# Patient Record
Sex: FEMALE | Race: WHITE | NOT HISPANIC OR LATINO | Employment: OTHER | URBAN - METROPOLITAN AREA
[De-identification: names, ages, dates, MRNs, and addresses within clinical notes are randomized per-mention and may not be internally consistent; named-entity substitution may affect disease eponyms.]

---

## 2019-01-24 DIAGNOSIS — M25.539 PAIN IN WRIST, UNSPECIFIED LATERALITY: Primary | ICD-10-CM

## 2019-01-30 ENCOUNTER — OFFICE VISIT (OUTPATIENT)
Dept: ORTHOPEDICS | Facility: CLINIC | Age: 64
End: 2019-01-30
Payer: MEDICARE

## 2019-01-30 ENCOUNTER — HOSPITAL ENCOUNTER (OUTPATIENT)
Dept: RADIOLOGY | Facility: HOSPITAL | Age: 64
Discharge: HOME | End: 2019-01-30
Attending: ORTHOPAEDIC SURGERY
Payer: MEDICARE

## 2019-01-30 VITALS
HEIGHT: 66 IN | HEART RATE: 80 BPM | DIASTOLIC BLOOD PRESSURE: 66 MMHG | SYSTOLIC BLOOD PRESSURE: 149 MMHG | BODY MASS INDEX: 39.86 KG/M2 | WEIGHT: 248 LBS

## 2019-01-30 DIAGNOSIS — M25.539 PAIN IN WRIST, UNSPECIFIED LATERALITY: ICD-10-CM

## 2019-01-30 DIAGNOSIS — M65.4 DE QUERVAIN'S DISEASE (TENOSYNOVITIS): Primary | ICD-10-CM

## 2019-01-30 PROCEDURE — 99204 PR OFFICE/OUTPT VISIT, NEW, LEVL IV, 45-59 MIN: ICD-10-PCS | Mod: S$PBB,,, | Performed by: ORTHOPAEDIC SURGERY

## 2019-01-30 PROCEDURE — 73110 X-RAY EXAM OF WRIST: CPT | Mod: 50,TC

## 2019-01-30 PROCEDURE — 73110 X-RAY EXAM OF WRIST: CPT | Mod: 26,50,, | Performed by: RADIOLOGY

## 2019-01-30 PROCEDURE — 99999 PR PBB SHADOW E&M-EST. PATIENT-LVL III: ICD-10-PCS | Mod: PBBFAC,,, | Performed by: ORTHOPAEDIC SURGERY

## 2019-01-30 PROCEDURE — 99999 PR PBB SHADOW E&M-EST. PATIENT-LVL III: CPT | Mod: PBBFAC,,, | Performed by: ORTHOPAEDIC SURGERY

## 2019-01-30 PROCEDURE — 99204 OFFICE O/P NEW MOD 45 MIN: CPT | Mod: S$PBB,,, | Performed by: ORTHOPAEDIC SURGERY

## 2019-01-30 PROCEDURE — 99213 OFFICE O/P EST LOW 20 MIN: CPT | Mod: PBBFAC,25,PN | Performed by: ORTHOPAEDIC SURGERY

## 2019-01-30 PROCEDURE — 73110 XR WRIST COMPLETE 3 VIEWS BILATERAL: ICD-10-PCS | Mod: 26,50,, | Performed by: RADIOLOGY

## 2019-01-30 RX ORDER — DULOXETIN HYDROCHLORIDE 30 MG/1
30 CAPSULE, DELAYED RELEASE ORAL NIGHTLY
COMMUNITY
Start: 2019-01-24

## 2019-01-30 RX ORDER — CELECOXIB 200 MG/1
200 CAPSULE ORAL DAILY
COMMUNITY
Start: 2019-01-24 | End: 2020-01-24

## 2019-01-30 NOTE — PROGRESS NOTES
Subjective:     Patient ID: Sandra Angel is a 63 y.o. female.    Chief Complaint: Pain of the Right Wrist and Pain of the Left Wrist    Patient is here for bilateral wrist pain. States both of them bother her the same. Reports they have been bothering her for 9 months. Reports no SHAKEEL. Reports no previous injury. Reports bilateral cortisone injections in October. States she wore thumb spica splints.      Wrist Pain    The pain is present in the right wrist and left wrist. This is a new problem. The current episode started more than 1 month ago. The problem occurs constantly. The problem has been gradually improving. The quality of the pain is described as sharp, burning and aching. The pain is at a severity of 4/10. Pertinent negatives include no fever or numbness. The symptoms are aggravated by lifting (gripping, writing). She has tried brace/corset, NSAIDs, injection treatment, cold, heat, OTC ointments and OTC pain meds for the symptoms. The treatment provided moderate relief. Physical therapy was not tried.      Past Medical History:   Diagnosis Date    Clotting disorder     Hypertension     Protein S deficiency      Past Surgical History:   Procedure Laterality Date    PULMONARY EMBOLISM SURGERY       History reviewed. No pertinent family history.  Social History     Socioeconomic History    Marital status:      Spouse name: Not on file    Number of children: Not on file    Years of education: Not on file    Highest education level: Not on file   Social Needs    Financial resource strain: Not on file    Food insecurity - worry: Not on file    Food insecurity - inability: Not on file    Transportation needs - medical: Not on file    Transportation needs - non-medical: Not on file   Occupational History    Not on file   Tobacco Use    Smoking status: Never Smoker    Smokeless tobacco: Never Used   Substance and Sexual Activity    Alcohol use: No    Drug use: No    Sexual activity: Not on  file   Other Topics Concern    Not on file   Social History Narrative    Not on file        Medication List           Accurate as of 1/30/19  6:58 PM. If you have any questions, ask your nurse or doctor.               CONTINUE taking these medications    ATIVAN 2 MG Tab  Generic drug:  LORazepam     celecoxib 200 MG capsule  Commonly known as:  CeleBREX     DULoxetine 30 MG capsule  Commonly known as:  CYMBALTA     esomeprazole 40 MG capsule  Commonly known as:  NEXIUM     fluticasone 50 mcg/actuation nasal spray  Commonly known as:  FLONASE  2 sprays by Each Nare route once daily.     warfarin 5 MG tablet  Commonly known as:  COUMADIN          Review of patient's allergies indicates:   Allergen Reactions    Sulfa (sulfonamide antibiotics) Hives and Rash     Review of Systems   Constitution: Negative for fever.   HENT: Negative for hearing loss.    Eyes: Negative for blurred vision.   Cardiovascular: Negative for chest pain and leg swelling.   Respiratory: Negative for shortness of breath.    Endocrine: Negative for polyuria.   Hematologic/Lymphatic: Negative for bleeding problem.   Skin: Negative for rash.   Musculoskeletal: Positive for back pain and joint pain. Negative for joint swelling, muscle cramps and muscle weakness.   Gastrointestinal: Negative for melena.   Genitourinary: Negative for hematuria.   Neurological: Negative for loss of balance, numbness and paresthesias.   Psychiatric/Behavioral: Negative for altered mental status.       Objective:   Body mass index is 40.03 kg/m².  Vitals:    01/30/19 1555   BP: (!) 149/66   Pulse: 80       General: Sandra is well-developed, well-nourished, appears stated age, in no acute distress, alert and oriented.       General    Vitals reviewed.  Constitutional: She is oriented to person, place, and time. She appears well-developed and well-nourished. No distress.   HENT:   Right Ear: External ear normal.   Left Ear: External ear normal.   Nose: Nose normal.   Eyes:  Pupils are equal, round, and reactive to light. Right eye exhibits no discharge. Left eye exhibits no discharge.   Neck: Normal range of motion.   Pulmonary/Chest: Effort normal. No respiratory distress.   Abdominal: Soft.   Neurological: She is alert and oriented to person, place, and time. No cranial nerve deficit. She exhibits normal muscle tone. Coordination normal.   Psychiatric: She has a normal mood and affect. Her behavior is normal. Judgment and thought content normal.             Right Hand/Wrist Exam     Inspection   Scars: Wrist - absent   Effusion: Wrist - absent   Bruising: Wrist - absent   Deformity: Wrist - deformity     Tenderness   The patient is tender to palpation of the radial area.    Range of Motion     Wrist   Extension:  50 normal   Flexion:  70 normal   Abduction: 20 normal  Adduction: 35 normal    Tests   Phalens Sign: negative  Tinel's sign (median nerve): negative  Finkelstein's test: positive  Carpal Tunnel Compression Test: negative  Cubital Tunnel Compression Test: negative      Other     Neuorologic Exam    Median Distribution: normal  Ulnar Distribution: normal  Radial Distribution: normal      Left Hand/Wrist Exam     Inspection   Scars: Wrist - absent   Effusion: Wrist - absent   Bruising: Wrist - absent   Deformity: Wrist - absent     Tenderness   The patient is tender to palpation of the radial area.     Range of Motion     Wrist   Extension:  50 normal   Flexion:  70 normal   Abduction: 20 normal  Adduction: 35 normal    Tests   Phalens Sign: negative  Tinel's sign (median nerve): negative  Finkelstein's test: positive  Carpal Tunnel Compression Test: negative  Cubital Tunnel Compression Test: negative      Other     Sensory Exam  Median Distribution: normal  Ulnar Distribution: normal  Radial Distribution: normal      Right Elbow Exam     Inspection   Scars: absent  Effusion: absent  Bruising: absent  Deformity: absent  Atrophy: absent    Other   Sensation: normal      Left  Elbow Exam     Inspection   Scars: absent  Effusion: absent  Bruising: absent  Deformity: absent  Atrophy: absent    Other   Sensation: normal        Muscle Strength   Right Upper Extremity   Wrist extension: 5/5/5   Wrist flexion: 5/5/5   : 5/5/5   Pinch Mechanism: 5/5  Intrinsics: 5/5  EPL (Extensor Pollicis Longus): 5/5  Left Upper Extremity  Wrist extension: 5/5/5   Wrist flexion: 5/5/5   :  5/5/5   Pinch Mechanism: 5/5  Intrinsics: 5/5  EPL (Extensor Pollicis Longus): 5/5    Vascular Exam     Right Pulses      Radial:                    2+      Left Pulses      Radial:                    2+      Capillary Refill  Right Hand: normal capillary refill  Left Hand: normal capillary refill    Edema  Right Forearm: absent  Left Forearm: absent      Imaging reviewed and interpreted today   Alberto Winston DO 1/30/2019       Narrative     EXAMINATION:  XR WRIST COMPLETE 3 VIEWS BILATERAL    CLINICAL HISTORY:  Pain in unspecified wrist    TECHNIQUE:  PA, lateral, and oblique views of both wrists were performed.    COMPARISON:  None    FINDINGS:  No acute fracture or dislocation.  The alignment is within normal limits.  Minimal degenerative changes associated with either wrist.      Impression       As above      Electronically signed by: Alberto Winston DO  Date: 01/30/2019  Time: 15:14       Assessment:     Encounter Diagnosis   Name Primary?    De Quervain's disease (tenosynovitis) Yes        Plan:     Reviewed findings with javier.  On chronic coumadin for hx of PE's.    New braces today  Will f/up in clinic prn for injections  PT/OT  Continue voltaren gel

## 2022-11-27 ENCOUNTER — OFFICE VISIT (OUTPATIENT)
Dept: URGENT CARE | Facility: CLINIC | Age: 67
End: 2022-11-27
Payer: MEDICARE

## 2022-11-27 VITALS
BODY MASS INDEX: 38.3 KG/M2 | HEART RATE: 85 BPM | DIASTOLIC BLOOD PRESSURE: 85 MMHG | OXYGEN SATURATION: 96 % | WEIGHT: 244 LBS | HEIGHT: 67 IN | RESPIRATION RATE: 20 BRPM | SYSTOLIC BLOOD PRESSURE: 159 MMHG | TEMPERATURE: 101 F

## 2022-11-27 DIAGNOSIS — U07.1 COVID-19: ICD-10-CM

## 2022-11-27 DIAGNOSIS — U07.1 COVID-19 VIRUS DETECTED: ICD-10-CM

## 2022-11-27 DIAGNOSIS — J02.9 SORE THROAT: Primary | ICD-10-CM

## 2022-11-27 LAB
CTP QC/QA: YES
MOLECULAR STREP A: NEGATIVE
POC MOLECULAR INFLUENZA A AGN: NEGATIVE
POC MOLECULAR INFLUENZA B AGN: NEGATIVE
SARS-COV-2 RDRP RESP QL NAA+PROBE: POSITIVE

## 2022-11-27 PROCEDURE — 87502 POCT INFLUENZA A/B MOLECULAR: ICD-10-PCS | Mod: QW,,, | Performed by: PHYSICIAN ASSISTANT

## 2022-11-27 PROCEDURE — 87635 SARS-COV-2 COVID-19 AMP PRB: CPT | Mod: QW,CR,, | Performed by: PHYSICIAN ASSISTANT

## 2022-11-27 PROCEDURE — 87502 INFLUENZA DNA AMP PROBE: CPT | Mod: QW,,, | Performed by: PHYSICIAN ASSISTANT

## 2022-11-27 PROCEDURE — 99203 PR OFFICE/OUTPT VISIT, NEW, LEVL III, 30-44 MIN: ICD-10-PCS | Mod: CR,,, | Performed by: PHYSICIAN ASSISTANT

## 2022-11-27 PROCEDURE — 87651 STREP A DNA AMP PROBE: CPT | Mod: QW,,, | Performed by: PHYSICIAN ASSISTANT

## 2022-11-27 PROCEDURE — 87651 POCT STREP A MOLECULAR: ICD-10-PCS | Mod: QW,,, | Performed by: PHYSICIAN ASSISTANT

## 2022-11-27 PROCEDURE — 87635: ICD-10-PCS | Mod: QW,CR,, | Performed by: PHYSICIAN ASSISTANT

## 2022-11-27 PROCEDURE — 99203 OFFICE O/P NEW LOW 30 MIN: CPT | Mod: CR,,, | Performed by: PHYSICIAN ASSISTANT

## 2022-11-27 RX ORDER — ZOLPIDEM TARTRATE 5 MG/1
5 TABLET ORAL NIGHTLY PRN
Status: ON HOLD | COMMUNITY
End: 2023-09-19 | Stop reason: HOSPADM

## 2022-11-27 RX ORDER — ALBUTEROL SULFATE 90 UG/1
2 AEROSOL, METERED RESPIRATORY (INHALATION) EVERY 6 HOURS PRN
Status: ON HOLD | COMMUNITY
Start: 2022-04-18 | End: 2023-09-19 | Stop reason: HOSPADM

## 2022-11-27 RX ORDER — METHYLPREDNISOLONE 4 MG/1
TABLET ORAL
Qty: 1 EACH | Refills: 0 | OUTPATIENT
Start: 2022-11-27 | End: 2023-08-03

## 2022-11-27 RX ORDER — CODEINE PHOSPHATE AND GUAIFENESIN 10; 100 MG/5ML; MG/5ML
10 SOLUTION ORAL EVERY 6 HOURS PRN
Qty: 200 ML | Refills: 0 | Status: SHIPPED | OUTPATIENT
Start: 2022-11-27 | End: 2022-12-02

## 2022-11-27 RX ORDER — PANTOPRAZOLE SODIUM 40 MG/1
1 TABLET, DELAYED RELEASE ORAL NIGHTLY
COMMUNITY
Start: 2022-08-24 | End: 2023-09-19

## 2022-11-27 RX ORDER — ATORVASTATIN CALCIUM 20 MG/1
20 TABLET, FILM COATED ORAL NIGHTLY
COMMUNITY
Start: 2022-11-21

## 2022-11-27 RX ORDER — CELECOXIB 200 MG/1
1 CAPSULE ORAL DAILY
COMMUNITY
Start: 2022-08-24 | End: 2023-08-24

## 2022-11-27 RX ORDER — WARFARIN 2.5 MG/1
2.5 TABLET ORAL
COMMUNITY
Start: 2022-08-24 | End: 2023-08-24

## 2022-11-27 RX ORDER — POLYETHYLENE GLYCOL 3350 17 G/17G
17 POWDER, FOR SOLUTION ORAL
COMMUNITY
Start: 2022-08-24 | End: 2023-08-24

## 2022-11-27 NOTE — PATIENT INSTRUCTIONS
COVID Positive, negative strep negative flu testing today    Start vitamin C 1000mg twice a day, zinc 100mg once a day, and vitamin D3 at least 2000 units a day. Current CDC guidelines recommend that you quarantine for 5 days starting the day after your symptoms began. Quarantine can end after 5 days as long as the last 24 hours of quarantine you are fever free and there is improvement of all your symptoms. Wear a mask around others for 5 additional days after quarantine. Treat your symptoms as you would the common cold. If you live with anybody, isolate yourself in a separate bedroom and use a separate bathroom. If your symptoms worsen or you develop shortness of breath or a fever over 102.5 , seek medical attention immediately.  Recommend alternating Tylenol and low-dose ibuprofen if needed for aches pains fever or chills.  Recommend alternating decongestant antihistamine and nasal spray daily as needed for upper respiratory symptoms.  Cheratussin sparingly lowest dose as needed for severe cough cold congestion body aches and rest.  Do not take sedating narcotic cough medication and drive or operate machinery.  May start steroid Dosepak tomorrow to help reduce pain and inflammation.  Recommend follow-up with primary care physician 3-5 days for re-evaluation if not improving.  Recommend emergency department evaluation sooner if symptoms worsens or airway problems develop

## 2022-11-27 NOTE — PROGRESS NOTES
"Subjective:       Patient ID: Sandra Angel is a 67 y.o. female.    Vitals:  height is 5' 7" (1.702 m) and weight is 110.7 kg (244 lb). Her temperature is 101 °F (38.3 °C) (abnormal). Her blood pressure is 159/85 (abnormal) and her pulse is 85. Her respiration is 20 and oxygen saturation is 96%.     Chief Complaint: Fever (Pt symptoms started yesterday, right ear pain, cough, fever (101-103), body aches, chills, vomiting, and mucus, sore throat, and headache. )    HPI  reports returning from vacation trip to [place] noticing on airline flights develop an of body aches URI symptoms in the last 24 hours transport to urgent Care for initial evaluation   Fever     Additional comments: Pt symptoms started yesterday, right ear pain,   cough, fever (101-103), body aches, chills, vomiting, and mucus, sore   throat, and headache.     Fever   This is a new problem. The current episode started yesterday. Associated symptoms include congestion, coughing, muscle aches and a sore throat. Pertinent negatives include no abdominal pain, chest pain, ear pain, headaches, nausea, rash, vomiting or wheezing.     Constitution: Positive for chills, fatigue and fever.   HENT:  Positive for congestion, postnasal drip and sore throat. Negative for ear pain, sinus pain, sinus pressure, trouble swallowing and voice change.    Neck: Negative for neck pain and neck swelling.   Cardiovascular:  Negative for chest pain.   Respiratory:  Positive for cough. Negative for shortness of breath, stridor and wheezing.    Gastrointestinal: Negative.  Negative for abdominal pain, nausea and vomiting.   Musculoskeletal:  Positive for muscle ache. Negative for pain, joint pain and back pain.   Skin: Negative.  Negative for rash and erythema.   Allergic/Immunologic: Negative.    Neurological:  Negative for headaches and altered mental status.   Psychiatric/Behavioral:  Negative for altered mental status.      Objective:      Physical Exam   Constitutional: She " is oriented to person, place, and time. She appears well-developed. She is cooperative.  Non-toxic appearance.      Comments:Awake alert ambulatory fatigued female covered in blanket attended by    obesity  HENT:   Head: Normocephalic.   Ears:   Right Ear: Hearing, tympanic membrane, external ear and ear canal normal.   Left Ear: Hearing, tympanic membrane, external ear and ear canal normal.   Nose: Congestion present. No mucosal edema, rhinorrhea or nasal deformity. No epistaxis. Right sinus exhibits no maxillary sinus tenderness and no frontal sinus tenderness. Left sinus exhibits no maxillary sinus tenderness and no frontal sinus tenderness.   Mouth/Throat: Uvula is midline, oropharynx is clear and moist and mucous membranes are normal. Mucous membranes are moist. No trismus in the jaw. Normal dentition. No uvula swelling. No oropharyngeal exudate, posterior oropharyngeal edema or posterior oropharyngeal erythema.   Eyes: Conjunctivae and lids are normal. No scleral icterus.   Neck: Trachea normal and phonation normal. Neck supple. No edema present. No erythema present. No neck rigidity present.   Cardiovascular: Normal rate, regular rhythm, normal heart sounds and normal pulses.   Pulmonary/Chest: Effort normal and breath sounds normal. No stridor. No respiratory distress. She has no decreased breath sounds. She has no wheezes. She has no rhonchi. She has no rales.   Musculoskeletal: Normal range of motion.         General: No swelling or deformity. Normal range of motion.   Neurological: no focal deficit. She is alert and oriented to person, place, and time. She exhibits normal muscle tone. Coordination normal.   Skin: Skin is warm, dry, intact, not diaphoretic, not pale and no rash. No erythema   Psychiatric: Her speech is normal and behavior is normal. Mood, judgment and thought content normal.   Nursing note and vitals reviewed.         Previous History      Review of patient's allergies indicates:  "  Allergen Reactions    Escitalopram oxalate Other (See Comments)     Daytime sleepiness    Sulfa (sulfonamide antibiotics) Hives and Rash       Past Medical History:   Diagnosis Date    Anxiety disorder, unspecified     Clotting disorder     Depression     High cholesterol     Hypertension     Protein S deficiency      Current Outpatient Medications   Medication Instructions    albuterol (PROVENTIL/VENTOLIN HFA) 90 mcg/actuation inhaler 2 puffs, Inhalation    atorvastatin (LIPITOR) 20 mg, Oral    celecoxib (CELEBREX) 200 MG capsule 1 capsule, Oral, Daily    DULoxetine (CYMBALTA) 30 mg, Oral, Daily    esomeprazole (NEXIUM) 40 mg, Before breakfast    fluticasone (FLONASE) 50 mcg/actuation nasal spray 2 sprays, Each Nostril, Daily    guaiFENesin-codeine 100-10 mg/5 ml (TUSSI-ORGANIDIN NR)  mg/5 mL syrup 10 mLs, Oral, Every 6 hours PRN    LORazepam (ATIVAN) 2 mg, Nightly    methylPREDNISolone (MEDROL DOSEPACK) 4 mg tablet use as directed    pantoprazole (PROTONIX) 40 MG tablet 1 tablet, Oral, Every morning    polyethylene glycol (GLYCOLAX) 17 g, Oral    warfarin (COUMADIN) 5 mg, Daily    warfarin (COUMADIN) 2.5 mg, Oral    zolpidem (AMBIEN) 5 mg, Oral, Nightly PRN     Past Surgical History:   Procedure Laterality Date    colostomy      HIP ASPIRATION AND DRAINAGE      KNEE ARTHROPLASTY      torn meniscus    PULMONARY EMBOLISM SURGERY      TONSILLECTOMY       Family History   Problem Relation Age of Onset    Rheum arthritis Mother     No Known Problems Father     No Known Problems Sister     Stroke Brother     Heart attack Brother     Liver cancer Brother        Social History     Tobacco Use    Smoking status: Every Day     Types: Cigarettes    Smokeless tobacco: Never   Substance Use Topics    Alcohol use: Not Currently    Drug use: Never        Physical Exam      Vital Signs Reviewed   BP (!) 159/85   Pulse 85   Temp (!) 101 °F (38.3 °C)   Resp 20   Ht 5' 7" (1.702 m)   Wt 110.7 kg (244 lb)   SpO2 96%   " BMI 38.22 kg/m²        Procedures    Procedures     Labs     Results for orders placed or performed in visit on 11/27/22   POCT COVID-19 Rapid Screening   Result Value Ref Range    POC Rapid COVID Positive (A) Negative     Acceptable Yes    POCT Influenza A/B MOLECULAR   Result Value Ref Range    POC Molecular Influenza A Ag Negative Negative, Not Reported    POC Molecular Influenza B Ag Negative Negative, Not Reported     Acceptable Yes    POCT Strep A, Molecular   Result Value Ref Range    Molecular Strep A, POC Negative (A) Negative     Acceptable Yes        Assessment:       1. Sore throat    2. COVID-19            Plan:     Patient declines Tylenol while in clinic.  Patient understands concern for fever body aches COVID viral infection and discharge plan with symptomatic Rx as patient not a candidate for Paxlovid on warfarin.  Patient and  verbalized understanding discharge plan home quarantine rest and follow-up.  Patient ready for discharge now    COVID Positive  Start vitamin C 1000mg twice a day, zinc 100mg once a day, and vitamin D3 at least 2000 units a day. Current CDC guidelines recommend that you quarantine for 5 days starting the day after your symptoms began. Quarantine can end after 5 days as long as the last 24 hours of quarantine you are fever free and there is improvement of all your symptoms. Wear a mask around others for 5 additional days after quarantine. Treat your symptoms as you would the common cold. If you live with anybody, isolate yourself in a separate bedroom and use a separate bathroom. If your symptoms worsen or you develop shortness of breath or a fever over 102.5 , seek medical attention immediately.  Recommend alternating Tylenol and low-dose ibuprofen if needed for aches pains fever or chills.  Recommend alternating decongestant antihistamine and nasal spray daily as needed for upper respiratory symptoms.  Cheratussin sparingly  lowest dose as needed for severe cough cold congestion body aches and rest.  Do not take sedating narcotic cough medication and drive or operate machinery.  May start steroid Dosepak tomorrow to help reduce pain and inflammation.  Recommend follow-up with primary care physician 3-5 days for re-evaluation if not improving.  Recommend emergency department evaluation sooner if symptoms worsens or airway problems develop    Sore throat  -     POCT COVID-19 Rapid Screening  -     POCT Influenza A/B MOLECULAR  -     POCT Strep A, Molecular    COVID-19    Other orders  -     methylPREDNISolone (MEDROL DOSEPACK) 4 mg tablet; use as directed  Dispense: 1 each; Refill: 0  -     guaiFENesin-codeine 100-10 mg/5 ml (TUSSI-ORGANIDIN NR)  mg/5 mL syrup; Take 10 mLs by mouth every 6 (six) hours as needed.  Dispense: 200 mL; Refill: 0

## 2023-08-03 ENCOUNTER — HOSPITAL ENCOUNTER (EMERGENCY)
Facility: HOSPITAL | Age: 68
Discharge: HOME | End: 2023-08-03
Attending: EMERGENCY MEDICINE
Payer: MEDICARE

## 2023-08-03 VITALS
DIASTOLIC BLOOD PRESSURE: 74 MMHG | TEMPERATURE: 99 F | HEART RATE: 84 BPM | HEIGHT: 67 IN | SYSTOLIC BLOOD PRESSURE: 139 MMHG | BODY MASS INDEX: 36.1 KG/M2 | WEIGHT: 230 LBS | OXYGEN SATURATION: 96 % | RESPIRATION RATE: 16 BRPM

## 2023-08-03 DIAGNOSIS — U07.1 COVID-19: Primary | ICD-10-CM

## 2023-08-03 DIAGNOSIS — M79.89 LEG SWELLING: ICD-10-CM

## 2023-08-03 DIAGNOSIS — R52 PAIN: ICD-10-CM

## 2023-08-03 DIAGNOSIS — R05.9 COUGH: ICD-10-CM

## 2023-08-03 LAB
ALBUMIN SERPL-MCNC: 3.8 G/DL (ref 3.4–4.8)
ALBUMIN/GLOB SERPL: 1.3 RATIO (ref 1.1–2)
ALP SERPL-CCNC: 69 UNIT/L (ref 40–150)
ALT SERPL-CCNC: 15 UNIT/L (ref 0–55)
APPEARANCE UR: ABNORMAL
APTT PPP: 44 SECONDS (ref 23.4–33.9)
AST SERPL-CCNC: 17 UNIT/L (ref 5–34)
BACTERIA #/AREA URNS AUTO: ABNORMAL /HPF
BASOPHILS # BLD AUTO: 0.02 X10(3)/MCL
BASOPHILS NFR BLD AUTO: 0.4 %
BILIRUB UR QL STRIP.AUTO: ABNORMAL
BILIRUBIN DIRECT+TOT PNL SERPL-MCNC: 0.9 MG/DL
BUN SERPL-MCNC: 12 MG/DL (ref 9.8–20.1)
CALCIUM SERPL-MCNC: 9.4 MG/DL (ref 8.4–10.2)
CHLORIDE SERPL-SCNC: 106 MMOL/L (ref 98–107)
CK SERPL-CCNC: 57 U/L (ref 29–168)
CO2 SERPL-SCNC: 29 MMOL/L (ref 23–31)
COLOR UR: ABNORMAL
CREAT SERPL-MCNC: 0.8 MG/DL (ref 0.55–1.02)
EOSINOPHIL # BLD AUTO: 0.02 X10(3)/MCL (ref 0–0.9)
EOSINOPHIL NFR BLD AUTO: 0.4 %
ERYTHROCYTE [DISTWIDTH] IN BLOOD BY AUTOMATED COUNT: 13.1 % (ref 11.5–17)
FLUAV AG UPPER RESP QL IA.RAPID: NOT DETECTED
FLUBV AG UPPER RESP QL IA.RAPID: NOT DETECTED
GFR SERPLBLD CREATININE-BSD FMLA CKD-EPI: >60 MLS/MIN/1.73/M2
GLOBULIN SER-MCNC: 3 GM/DL (ref 2.4–3.5)
GLUCOSE SERPL-MCNC: 91 MG/DL (ref 82–115)
GLUCOSE UR QL STRIP.AUTO: NEGATIVE
HCT VFR BLD AUTO: 40.6 % (ref 37–47)
HGB BLD-MCNC: 13.6 G/DL (ref 12–16)
IMM GRANULOCYTES # BLD AUTO: 0.01 X10(3)/MCL (ref 0–0.04)
IMM GRANULOCYTES NFR BLD AUTO: 0.2 %
INR PPP: 2.9 (ref 2–3)
KETONES UR QL STRIP.AUTO: ABNORMAL
LEUKOCYTE ESTERASE UR QL STRIP.AUTO: ABNORMAL
LYMPHOCYTES # BLD AUTO: 1.09 X10(3)/MCL (ref 0.6–4.6)
LYMPHOCYTES NFR BLD AUTO: 24.1 %
MCH RBC QN AUTO: 31.9 PG (ref 27–31)
MCHC RBC AUTO-ENTMCNC: 33.5 G/DL (ref 33–36)
MCV RBC AUTO: 95.1 FL (ref 80–94)
MONOCYTES # BLD AUTO: 0.68 X10(3)/MCL (ref 0.1–1.3)
MONOCYTES NFR BLD AUTO: 15 %
NEUTROPHILS # BLD AUTO: 2.7 X10(3)/MCL (ref 2.1–9.2)
NEUTROPHILS NFR BLD AUTO: 59.9 %
NITRITE UR QL STRIP.AUTO: NEGATIVE
NRBC BLD AUTO-RTO: 0 %
PH UR STRIP.AUTO: 8 [PH]
PLATELET # BLD AUTO: 161 X10(3)/MCL (ref 130–400)
PMV BLD AUTO: 11.6 FL (ref 7.4–10.4)
POTASSIUM SERPL-SCNC: 3.8 MMOL/L (ref 3.5–5.1)
PROT SERPL-MCNC: 6.8 GM/DL (ref 5.8–7.6)
PROT UR QL STRIP.AUTO: 30
PROTHROMBIN TIME: 30.2 SECONDS (ref 11.7–14.5)
RBC # BLD AUTO: 4.27 X10(6)/MCL (ref 4.2–5.4)
RBC #/AREA URNS AUTO: ABNORMAL /HPF
RBC UR QL AUTO: NEGATIVE
RSV A 5' UTR RNA NPH QL NAA+PROBE: NOT DETECTED
SARS-COV-2 RNA RESP QL NAA+PROBE: DETECTED
SODIUM SERPL-SCNC: 145 MMOL/L (ref 136–145)
SP GR UR STRIP.AUTO: 1.02
SQUAMOUS #/AREA URNS AUTO: ABNORMAL /HPF
UROBILINOGEN UR STRIP-ACNC: >=8
WBC # SPEC AUTO: 4.52 X10(3)/MCL (ref 4.5–11.5)
WBC #/AREA URNS AUTO: ABNORMAL /HPF

## 2023-08-03 PROCEDURE — 0241U COVID/RSV/FLU A&B PCR: CPT | Performed by: EMERGENCY MEDICINE

## 2023-08-03 PROCEDURE — 25000003 PHARM REV CODE 250: Performed by: EMERGENCY MEDICINE

## 2023-08-03 PROCEDURE — 81001 URINALYSIS AUTO W/SCOPE: CPT | Performed by: EMERGENCY MEDICINE

## 2023-08-03 PROCEDURE — 99285 EMERGENCY DEPT VISIT HI MDM: CPT | Mod: 25

## 2023-08-03 PROCEDURE — 80053 COMPREHEN METABOLIC PANEL: CPT | Performed by: EMERGENCY MEDICINE

## 2023-08-03 PROCEDURE — 85730 THROMBOPLASTIN TIME PARTIAL: CPT | Performed by: EMERGENCY MEDICINE

## 2023-08-03 PROCEDURE — 85025 COMPLETE CBC W/AUTO DIFF WBC: CPT | Performed by: EMERGENCY MEDICINE

## 2023-08-03 PROCEDURE — 82550 ASSAY OF CK (CPK): CPT | Performed by: EMERGENCY MEDICINE

## 2023-08-03 PROCEDURE — 85610 PROTHROMBIN TIME: CPT | Performed by: EMERGENCY MEDICINE

## 2023-08-03 RX ORDER — HYDROCODONE BITARTRATE AND ACETAMINOPHEN 5; 325 MG/1; MG/1
1 TABLET ORAL EVERY 6 HOURS PRN
Qty: 12 TABLET | Refills: 0 | Status: ON HOLD | OUTPATIENT
Start: 2023-08-03 | End: 2023-09-19 | Stop reason: HOSPADM

## 2023-08-03 RX ORDER — HYDROCODONE BITARTRATE AND ACETAMINOPHEN 5; 325 MG/1; MG/1
1 TABLET ORAL
Status: COMPLETED | OUTPATIENT
Start: 2023-08-03 | End: 2023-08-03

## 2023-08-03 RX ORDER — ONDANSETRON 4 MG/1
4 TABLET, ORALLY DISINTEGRATING ORAL
Status: COMPLETED | OUTPATIENT
Start: 2023-08-03 | End: 2023-08-03

## 2023-08-03 RX ADMIN — HYDROCODONE BITARTRATE AND ACETAMINOPHEN 1 TABLET: 5; 325 TABLET ORAL at 12:08

## 2023-08-03 RX ADMIN — ONDANSETRON 4 MG: 4 TABLET, ORALLY DISINTEGRATING ORAL at 12:08

## 2023-08-03 NOTE — DISCHARGE INSTRUCTIONS
Do not take the Norco with your ativan as together the medications may make your too sleepy or put you at risk for an overdose. Only take one or the other.

## 2023-08-03 NOTE — ED PROVIDER NOTES
Encounter Date: 8/3/2023       History     Chief Complaint   Patient presents with    medical evaluation     Pt relates recent trip to [facility] for a connference and relates (1) pain to right knee with walking  9 miles a day, at least (2) exposure to Covid with complaint flu like symptoms, now (3) lower back pain     Patient is a 68 yo F presenting with body aches, chills, cough, and R leg swelling. She was recently in [place] and did much more walking than usual. Her right leg feels like it is a little more swollen than usual. No falls or accidents. She is on coumadin and has a hx of blood clots in the R leg but she is uncertain if they resolved. She has mild intermittent non productive cough. She has some low back pain that is chronic. She has some urinary incontinence but she thinks its is mostly due to not being able to get to the bathroom in time. No new numbness or saddle anesthesia.         Review of patient's allergies indicates:   Allergen Reactions    Escitalopram oxalate Other (See Comments)     Daytime sleepiness    Sulfa (sulfonamide antibiotics) Hives and Rash     Past Medical History:   Diagnosis Date    Anxiety disorder, unspecified     Clotting disorder     Depression     High cholesterol     Hypertension     Protein S deficiency      Past Surgical History:   Procedure Laterality Date    colostomy      HIP ASPIRATION AND DRAINAGE      KNEE ARTHROPLASTY      torn meniscus    PULMONARY EMBOLISM SURGERY      TONSILLECTOMY       Family History   Problem Relation Age of Onset    Rheum arthritis Mother     No Known Problems Father     No Known Problems Sister     Stroke Brother     Heart attack Brother     Liver cancer Brother      Social History     Tobacco Use    Smoking status: Every Day     Current packs/day: 0.00     Types: Cigarettes    Smokeless tobacco: Never   Substance Use Topics    Alcohol use: Not Currently    Drug use: Never     Review of Systems   Constitutional:  Positive for chills, fatigue  and fever.   HENT:  Positive for congestion, postnasal drip and rhinorrhea. Negative for sinus pressure and sore throat.    Respiratory:  Positive for cough. Negative for shortness of breath.    Cardiovascular:  Negative for chest pain and leg swelling.   Gastrointestinal:  Negative for abdominal pain, nausea and vomiting.   Genitourinary:  Negative for dysuria.   Musculoskeletal:  Positive for back pain.   Skin:  Negative for rash.   Neurological:  Positive for weakness.   Hematological:  Does not bruise/bleed easily.       Physical Exam     Initial Vitals [08/03/23 1123]   BP Pulse Resp Temp SpO2   139/74 84 18 98.8 °F (37.1 °C) 96 %      MAP       --         Physical Exam    Nursing note and vitals reviewed.  Constitutional: She appears well-developed and well-nourished. No distress.   HENT:   Head: Normocephalic and atraumatic.   Eyes: Conjunctivae are normal.   Neck: Neck supple.   Cardiovascular:  Normal rate, regular rhythm and normal heart sounds.           No murmur heard.  Pulmonary/Chest: Breath sounds normal. No respiratory distress. She has no wheezes. She has no rhonchi.   Abdominal: Abdomen is soft. Bowel sounds are normal. She exhibits no distension. There is no abdominal tenderness. There is no rebound and no guarding.   Musculoskeletal:         General: No edema. Normal range of motion.      Cervical back: Neck supple.      Comments: BLE: Neurovascularly intact. There is mild swelling of the RLE when compared to the let. No erythema.  Mild diffuse TTP of the R knee. No pain with ROM of the R hip. No midline TTP of the lumbar spine.      Neurological: She is alert and oriented to person, place, and time.   Skin: Skin is warm and dry.   Psychiatric: She has a normal mood and affect. Thought content normal.         ED Course   Procedures  Labs Reviewed   PROTIME-INR - Abnormal; Notable for the following components:       Result Value    PT 30.2 (*)     All other components within normal limits   APTT  - Abnormal; Notable for the following components:    PTT 44.0 (*)     All other components within normal limits   COVID/RSV/FLU A&B PCR - Abnormal; Notable for the following components:    SARS-CoV-2 PCR Detected (*)     All other components within normal limits    Narrative:     The Xpert Xpress SARS-CoV-2/FLU/RSV plus is a rapid, multiplexed real-time PCR test intended for the simultaneous qualitative detection and differentiation of SARS-CoV-2, Influenza A, Influenza B, and respiratory syncytial virus (RSV) viral RNA in either nasopharyngeal swab or nasal swab specimens.         CBC WITH DIFFERENTIAL - Abnormal; Notable for the following components:    MCV 95.1 (*)     MCH 31.9 (*)     MPV 11.6 (*)     All other components within normal limits   URINALYSIS, REFLEX TO URINE CULTURE - Abnormal; Notable for the following components:    Appearance, UA Hazy (*)     Protein, UA 30 (*)     Ketones, UA Trace (*)     Bilirubin, UA Small (*)     Urobilinogen, UA >=8.0 (*)     Leukocyte Esterase, UA Trace (*)     All other components within normal limits   URINALYSIS, MICROSCOPIC - Abnormal; Notable for the following components:    Bacteria, UA Few (*)     Squamous Epithelial Cells, UA Moderate (*)     All other components within normal limits   CK - Normal   CBC W/ AUTO DIFFERENTIAL    Narrative:     The following orders were created for panel order CBC auto differential.  Procedure                               Abnormality         Status                     ---------                               -----------         ------                     CBC with Differential[[id number]]        Abnormal            Final result                 Please view results for these tests on the individual orders.   COMPREHENSIVE METABOLIC PANEL          Imaging Results              US Lower Extremity Veins Right (Final result)  Result time 08/03/23 13:45:13      Final result by Leonidas Sanchez MD (08/03/23 13:45:13)                    Impression:      Negative exam for right lower extremity thrombus.      Electronically signed by: Leonidas Sanchez  Date:    08/03/2023  Time:    13:45               Narrative:    EXAMINATION:  US LOWER EXTREMITY VEINS RIGHT    CLINICAL HISTORY:  Other specified soft tissue disorders    COMPARISON:  None    FINDINGS:  Sonographic images with color and spectral analysis were obtained of the right lower extremity venous system.    The imaged right lower extremity veins demonstrate normal flow, compressibility, and augmentation without evidence of thrombus.                                       X-Ray Knee 3 View Right (Final result)  Result time 08/03/23 13:37:06      Final result by Brittany Dunn MD (08/03/23 13:37:06)                   Impression:      No acute osseous abnormality.      Electronically signed by: Brittany Dunn  Date:    08/03/2023  Time:    13:37               Narrative:    EXAMINATION:  XR KNEE 3 VIEW RIGHT    CLINICAL HISTORY:  Pain, unspecified    TECHNIQUE:  AP, lateral, and Merchant views of the right knee were performed.    COMPARISON:  None    FINDINGS:  No fracture. No dislocation. Moderate narrowing at the medial compartment with marginal osteophyte.    Regional soft tissues are normal.                                       X-Ray Hip 2 or 3 views Right (with Pelvis when performed) (Final result)  Result time 08/03/23 13:18:01      Final result by Brittany Dunn MD (08/03/23 13:18:01)                   Impression:      No acute osseous abnormality.      Electronically signed by: Brittany Dunn  Date:    08/03/2023  Time:    13:18               Narrative:    EXAMINATION:  XR HIP WITH PELVIS WHEN PERFORMED, 2 OR 3  VIEWS RIGHT    CLINICAL HISTORY:  Pain, unspecified    TECHNIQUE:  AP view of the pelvis and AP and frog leg lateral view of the right hip were performed.    COMPARISON:  None.    FINDINGS:  BONE: No fracture. No dislocation.    SOFT TISSUES: Unremarkable.                                        X-Ray Chest AP Portable (Final result)  Result time 08/03/23 13:14:09      Final result by Brain Roldan MD (08/03/23 13:14:09)                   Impression:      No acute chest disease is identified.      Electronically signed by: Brain Roldan  Date:    08/03/2023  Time:    13:14               Narrative:    EXAMINATION:  XR CHEST AP PORTABLE    CLINICAL HISTORY:  , Cough, unspecified.    FINDINGS:  No alveolar consolidation, effusion, or pneumothorax is seen.   The thoracic aorta is normal  cardiac silhouette, central pulmonary vessels and mediastinum are normal in size and are grossly unremarkable.   visualized osseous structures are grossly unremarkable.                                       Medications   HYDROcodone-acetaminophen 5-325 mg per tablet 1 tablet (1 tablet Oral Given 8/3/23 1220)   ondansetron disintegrating tablet 4 mg (4 mg Oral Given 8/3/23 1220)                 ED Course as of 08/07/23 1951   Thu Aug 03, 2023   1244 Patient able to void completely. No retention on post void.  [GM]   1334 SARS-CoV2 (COVID-19) Qualitative PCR(!): Detected [GM]      ED Course User Index  [GM] Olga Resendiz MD                   Clinical Impression:   Final diagnoses:  [R52] Pain  [M79.89] Leg swelling  [R05.9] Cough  [U07.1] COVID-19 (Primary)        ED Disposition Condition    Discharge Stable          ED Prescriptions       Medication Sig Dispense Start Date End Date Auth. Provider    molnupiravir 200 mg capsule (EUA) Take 4 capsules (800 mg total) by mouth every 12 (twelve) hours. for 5 days 40 capsule 8/3/2023 8/8/2023 Olga Resendiz MD    HYDROcodone-acetaminophen (NORCO) 5-325 mg per tablet Take 1 tablet by mouth every 6 (six) hours as needed for Pain. 12 tablet 8/3/2023 -- Olga Resendiz MD          Follow-up Information       Follow up With Specialties Details Why Contact Loyd Arias MD Internal Medicine In 5 days If symptoms worsen, return to the ED  [address]  [address]  [facility] [address]  [phone]               Olga Resendiz MD  08/07/23 1951

## 2023-08-03 NOTE — ED TRIAGE NOTES
Pt relates recent trip to [employer] [place] for a connference and relates (1) pain to right knee with walking  9 miles a day, at least (2) exposure to Covid with complaint flu like symptoms, now (3) lower back pain

## 2023-08-30 ENCOUNTER — HOSPITAL ENCOUNTER (EMERGENCY)
Facility: HOSPITAL | Age: 68
Discharge: HOME | End: 2023-08-30
Attending: STUDENT IN AN ORGANIZED HEALTH CARE EDUCATION/TRAINING PROGRAM
Payer: MEDICARE

## 2023-08-30 VITALS
HEART RATE: 70 BPM | BODY MASS INDEX: 36.57 KG/M2 | TEMPERATURE: 99 F | DIASTOLIC BLOOD PRESSURE: 49 MMHG | OXYGEN SATURATION: 95 % | WEIGHT: 233 LBS | HEIGHT: 67 IN | SYSTOLIC BLOOD PRESSURE: 124 MMHG | RESPIRATION RATE: 18 BRPM

## 2023-08-30 DIAGNOSIS — M79.661 PAIN AND SWELLING OF RIGHT LOWER LEG: ICD-10-CM

## 2023-08-30 DIAGNOSIS — M25.561 ACUTE PAIN OF RIGHT KNEE: Primary | ICD-10-CM

## 2023-08-30 DIAGNOSIS — M79.89 LEG SWELLING: ICD-10-CM

## 2023-08-30 DIAGNOSIS — M79.89 PAIN AND SWELLING OF RIGHT LOWER LEG: ICD-10-CM

## 2023-08-30 PROCEDURE — 99284 EMERGENCY DEPT VISIT MOD MDM: CPT | Mod: 25

## 2023-08-30 NOTE — ED PROVIDER NOTES
Encounter Date: 8/30/2023       History     Chief Complaint   Patient presents with    Knee Pain     C/o continued right knee pain and swelling X several weeks. States the pain now extends down to her ankle and she is worried it may be blood clots. Sees Dr Bull in [place]     See MDM    The history is provided by the patient. No  was used.     Review of patient's allergies indicates:   Allergen Reactions    Escitalopram oxalate Other (See Comments)     Daytime sleepiness    Sulfa (sulfonamide antibiotics) Hives and Rash     Past Medical History:   Diagnosis Date    Anxiety disorder, unspecified     Clotting disorder     Depression     High cholesterol     Hypertension     Protein S deficiency      Past Surgical History:   Procedure Laterality Date    colostomy      HIP ASPIRATION AND DRAINAGE      KNEE ARTHROPLASTY      torn meniscus    PULMONARY EMBOLISM SURGERY      TONSILLECTOMY       Family History   Problem Relation Age of Onset    Rheum arthritis Mother     No Known Problems Father     No Known Problems Sister     Stroke Brother     Heart attack Brother     Liver cancer Brother      Social History     Tobacco Use    Smoking status: Every Day     Types: Cigarettes    Smokeless tobacco: Never   Substance Use Topics    Alcohol use: Not Currently    Drug use: Never     Review of Systems   Musculoskeletal:  Positive for joint swelling (right knee pain down leg).   All other systems reviewed and are negative.      Physical Exam     Initial Vitals [08/30/23 1521]   BP Pulse Resp Temp SpO2   (!) 124/49 70 18 98.6 °F (37 °C) 95 %      MAP       --         Physical Exam    Nursing note and vitals reviewed.  Constitutional: She appears well-developed and well-nourished.   Cardiovascular:  Normal rate and intact distal pulses.           Pulmonary/Chest: No respiratory distress.   Musculoskeletal:      Right knee: Swelling and bony tenderness present. No erythema or ecchymosis. Tenderness present.       Right lower leg: Swelling present.      Comments: Right knee down is painful for patient. No redness. No wounds. No bruising. There is swelling in comparison to left knee and lower leg. She can bend and straighten and can weight bear with cane.     All other adjacent joints otherwise normal       Neurological: She is alert and oriented to person, place, and time.   Skin: Skin is warm and dry.   Psychiatric: She has a normal mood and affect.         ED Course   Procedures  Labs Reviewed - No data to display       Imaging Results              US Lower Extremity Veins Right (Final result)  Result time 08/30/23 16:18:46      Final result by Brittany Dunn MD (08/30/23 16:18:46)                   Impression:      No evidence of deep venous thrombosis in the right lower extremity.      Electronically signed by: Brittany Dunn  Date:    08/30/2023  Time:    16:18               Narrative:    EXAMINATION:  US LOWER EXTREMITY VEINS RIGHT    CLINICAL HISTORY:  Other specified soft tissue disorders    TECHNIQUE:  Duplex and color flow Doppler evaluation and graded compression of the right lower extremity veins was performed.    COMPARISON:  08/03/2023    FINDINGS:  Right thigh veins: The common femoral, femoral, popliteal, upper greater saphenous, and deep femoral veins are patent and free of thrombus. The veins are normally compressible and have normal phasic flow and augmentation response.    Right calf veins: The visualized calf veins are patent.    Contralateral CFV: Not imaged    Miscellaneous: None                                       Medications - No data to display  Medical Decision Making  69 y/o female presents with right knee pain since 8/3 with swelling to the leg as well. Nontraumatic. She was seen here on 8/3/23 and had xray and US and labs done. She was covid19+ at that time. Her knee xray and US neg. She is on coumadin for previous dvt. She followed up with orthopedist in [place], dr. Cueto (he  did her other knee). She states he gave cortisone injection in her right knee at the appt and did xrays and put her on norco. Discussed MRI if symptoms didn't improve. She states the pain over the last couple days seemed to feel different and like her previous blood clot so was concerned perhaps she had developed one since 8/3 due to her decreased mobility. No fever     US veins neg today for DVT. Reviwed previous xray from 8/3 which was negative. She has good pulse in the right foot. There is no redness to suggest infection/cellulitis. No wounds. She can bend, straighten, dorsiflex, plantar flex. She ambulates with cane. Discussed with her on coumadin unable to give nsaids. She has norco. Encouraged f/u with ortopedist for MRI and re-evaluation    Amount and/or Complexity of Data Reviewed  External Data Reviewed: radiology.     Details: Xr knee and US RLE r/o DVT both neg on 8/3  Radiology: ordered. Decision-making details documented in ED Course.     Details: US neg for DVT      Additional MDM:   Differential Diagnosis:   Other: The following diagnoses were also considered and will be evaluated: dvt, PVD and cellulitis.                            Clinical Impression:   Final diagnoses:  [M79.89] Leg swelling  [M25.561] Acute pain of right knee (Primary)  [M79.661, M79.89] Pain and swelling of right lower leg        ED Disposition Condition    Discharge Stable          ED Prescriptions    None       Follow-up Information       Follow up With Specialties Details Why Contact Info    Loyd Guadalupe MD Internal Medicine   [address] [facility]  [address]  [facility] [id number] [phone]      Josh Mendosa MD Orthopedic Surgery   [address]  [address]  [facility] [address]  [phone]               Ivette Avery, SAM  08/30/23 3856

## 2023-08-30 NOTE — DISCHARGE INSTRUCTIONS
Elevate right leg. Continue with norco. Call orthopedist for follow up and possible MRI if warranted.

## 2023-09-07 ENCOUNTER — OFFICE VISIT (OUTPATIENT)
Dept: ORTHOPEDICS | Facility: CLINIC | Age: 68
End: 2023-09-07
Payer: MEDICARE

## 2023-09-07 DIAGNOSIS — M25.461 EFFUSION, RIGHT KNEE: ICD-10-CM

## 2023-09-07 DIAGNOSIS — M79.89 PAIN AND SWELLING OF RIGHT LOWER LEG: ICD-10-CM

## 2023-09-07 DIAGNOSIS — S83.241A ACUTE MEDIAL MENISCAL TEAR, RIGHT, INITIAL ENCOUNTER: Primary | ICD-10-CM

## 2023-09-07 DIAGNOSIS — M25.561 ACUTE PAIN OF RIGHT KNEE: ICD-10-CM

## 2023-09-07 DIAGNOSIS — M79.661 PAIN AND SWELLING OF RIGHT LOWER LEG: ICD-10-CM

## 2023-09-07 PROCEDURE — 99203 OFFICE O/P NEW LOW 30 MIN: CPT | Mod: ,,, | Performed by: ORTHOPAEDIC SURGERY

## 2023-09-07 PROCEDURE — 99203 PR OFFICE/OUTPT VISIT, NEW, LEVL III, 30-44 MIN: ICD-10-PCS | Mod: ,,, | Performed by: ORTHOPAEDIC SURGERY

## 2023-09-07 NOTE — PROGRESS NOTES
Subjective:    CC: Pain of the Right Knee (RT knee pain that started 07/31/23. Woke up with pain. Ambulating with cane. Tried ice and pain meds with no relief. Heat works a little. Complaints of pain and swelling. No prior sx. )       HPI:  Patient comes in today for her 1st visit.  Patient complains of right knee pain for the last 5 weeks.  Patient states she woke up, has severe pain, difficulty with weight-bearing.  She was seen by her regular orthopedic doctor, had x-rays as well as an injection, she states injection made it worse.  She was told she needed an MRI.  She has since been to the ER 3 times, had 2 ultrasounds of her leg which were negative.  She does have a history of clots.  She is here today complaining of right knee pain she denies any change, she is presently with family.    ROS: Refer to HPI for pertinent ROS. All other 12 point systems negative.    Objective:  There were no vitals filed for this visit.     Physical Exam:  The patient is well-nourished, well-developed and in no apparent distress, pleasant and cooperative. Examination of the right lower extremity compartments are soft and warm. Skin is intact. There are no signs or symptoms of DVT or infection. There is a small joint effusion. There is no erythema. Tender to palpation along the medial joint line , right knee range of motion is 0-105. The knee is stable to exam with varus and valgus stressing. Negative anterior and posterior drawer. Negative Lachman´s.  Positive medial Jericho's test. Patella grind is positive, Negative for apprehension. Neurovascularly intact distally.    Images: . Images Reviewed and discussed with patient.    Assessment:  1. Acute pain of right knee  - Ambulatory referral/consult to Orthopedics    2. Pain and swelling of right lower leg  - Ambulatory referral/consult to Orthopedics    3. Acute medial meniscal tear, right, initial encounter  - MRI Knee Without Contrast Right    4. Effusion, right knee  - MRI Knee  Without Contrast Right        Plan:  At this time we discussed her physical exam and previous imaging findings.  I agree, patient needs an MRI of her right knee.  We will proceed with this.  I would be happy to see her back next week with her results, she will continue low-impact activities in the meantime.    Follow UP: No follow-ups on file.

## 2023-09-13 ENCOUNTER — HOSPITAL ENCOUNTER (OUTPATIENT)
Dept: RADIOLOGY | Facility: HOSPITAL | Age: 68
Discharge: HOME | End: 2023-09-13
Attending: ORTHOPAEDIC SURGERY
Payer: MEDICARE

## 2023-09-13 PROCEDURE — 73721 MRI JNT OF LWR EXTRE W/O DYE: CPT | Mod: TC,RT

## 2023-09-14 ENCOUNTER — CLINICAL SUPPORT (OUTPATIENT)
Dept: LAB | Facility: HOSPITAL | Age: 68
End: 2023-09-14
Attending: ORTHOPAEDIC SURGERY
Payer: MEDICARE

## 2023-09-14 ENCOUNTER — OFFICE VISIT (OUTPATIENT)
Dept: ORTHOPEDICS | Facility: CLINIC | Age: 68
End: 2023-09-14
Payer: MEDICARE

## 2023-09-14 VITALS — HEIGHT: 67 IN | BODY MASS INDEX: 36.57 KG/M2 | WEIGHT: 233 LBS

## 2023-09-14 DIAGNOSIS — Z01.818 PRE-OP TESTING: ICD-10-CM

## 2023-09-14 DIAGNOSIS — S83.241A ACUTE MEDIAL MENISCAL TEAR, RIGHT, INITIAL ENCOUNTER: Primary | ICD-10-CM

## 2023-09-14 DIAGNOSIS — S83.241A ACUTE MEDIAL MENISCAL TEAR, RIGHT, INITIAL ENCOUNTER: ICD-10-CM

## 2023-09-14 DIAGNOSIS — M84.361A STRESS FRACTURE OF RIGHT TIBIA, INITIAL ENCOUNTER: ICD-10-CM

## 2023-09-14 PROCEDURE — 93005 ELECTROCARDIOGRAM TRACING: CPT

## 2023-09-14 PROCEDURE — 99214 PR OFFICE/OUTPT VISIT, EST, LEVL IV, 30-39 MIN: ICD-10-PCS | Mod: ,,, | Performed by: ORTHOPAEDIC SURGERY

## 2023-09-14 PROCEDURE — 93010 ELECTROCARDIOGRAM REPORT: CPT | Mod: ,,, | Performed by: INTERNAL MEDICINE

## 2023-09-14 PROCEDURE — 99214 OFFICE O/P EST MOD 30 MIN: CPT | Mod: ,,, | Performed by: ORTHOPAEDIC SURGERY

## 2023-09-14 PROCEDURE — 93010 EKG 12-LEAD: ICD-10-PCS | Mod: ,,, | Performed by: INTERNAL MEDICINE

## 2023-09-14 RX ORDER — SODIUM CHLORIDE, SODIUM GLUCONATE, SODIUM ACETATE, POTASSIUM CHLORIDE AND MAGNESIUM CHLORIDE 30; 37; 368; 526; 502 MG/100ML; MG/100ML; MG/100ML; MG/100ML; MG/100ML
INJECTION, SOLUTION INTRAVENOUS CONTINUOUS
Status: CANCELLED | OUTPATIENT
Start: 2023-09-14

## 2023-09-14 NOTE — PROGRESS NOTES
"Subjective:    CC: Results (MRI results of R knee. states no changes but she has started to feel like her pain has been radiating to his lower back and causes locking/spasms. )       HPI:  Patient returns today for repeat exam.  Patient had an MRI of her right knee.  We have discussed results.  Patient continues to ambulate with a cane, she states now in his throwing out her lower back.  She does have a unstable medial meniscus tear, causing a stress reaction, stress fracture insufficiency fracture of her medial tibial plateau.  Patient denies any previous injuries to her right knee, she had no complaints before her injury back in July.  She is had a previous steroid injection as well more recently without any relief, actually making it worse.    ROS: Refer to [facility] for pertinent ROS. All other 12 point systems negative.    Objective:  Vitals:    09/14/23 1531   Weight: 105.7 kg (233 lb)   Height: 5' 7" (1.702 m)        Physical Exam:  The patient is well-nourished, well-developed and in no apparent distress, pleasant and cooperative. Examination of the right lower extremity compartments are soft and warm. Skin is intact. There are no signs or symptoms of DVT or infection. There is no obvious joint effusion. There is no erythema. Tender to palpation along the medial joint line , right knee range of motion is 0-105. The knee is stable to exam with varus and valgus stressing. Negative anterior and posterior drawer. Negative Lachman´s.  Positive medial Jericho's test. Patella grind is positive, Negative for apprehension. Neurovascularly intact distally.    Images:  MRI reviewed. Images Reviewed and discussed with patient.    Assessment:  1. Acute medial meniscal tear, right, initial encounter    2. Stress fracture of right tibia, initial encounter        Plan:  At this time we discussed her physical exam and MRI findings.  We have discussed at length the pros and cons to additional conservative treatments well surgical " intervention.  We have discussed the down time rehab process afterwards.  We have discussed limitations of knee arthroscopy, she would like to proceed with this.  We will set this up at her convenience.    Follow UP: No follow-ups on file.

## 2023-09-14 NOTE — LETTER
Date 2023    Re:   Sandra Angel    :   51    Dr. Guadalupe ,           The above named patient is scheduled to have a right knee medial meniscectomy     On 2023.       *Type of Anesthesia: general    OKAY TO STAY ON HER COUMADIN    This patient needs surgical clearance from your office for this procedure.  Please perform necessary tests in order for this patient to be medically cleared for surgery. Please send or fax the clearance letter with most recent ECHO, EKG or stress test, to our office as soon as possible.     Our fax number is [phone].          We appreciate your help in getting our patient cleared for surgery.  Please feel free to call our office should you have any questions, [phone].             Thank You,  Dr. Deondre MD

## 2023-09-18 ENCOUNTER — ANESTHESIA EVENT (OUTPATIENT)
Dept: SURGERY | Facility: HOSPITAL | Age: 68
End: 2023-09-18
Payer: MEDICARE

## 2023-09-18 ENCOUNTER — TELEPHONE (OUTPATIENT)
Dept: ORTHOPEDICS | Facility: CLINIC | Age: 68
End: 2023-09-18
Payer: MEDICARE

## 2023-09-18 NOTE — H&P
Admission History & Physical    Subjective:    CC: Results (MRI results of R knee. states no changes but she has started to feel like her pain has been radiating to his lower back and causes locking/spasms. )       HPI:  Sandra Angel presents today for preoperative evaluation for right knee arthroscopy with medial meniscectomy. I reviewed the indications for surgery. The risks and benefits of the proposed and alternative treatments were discussed with the patient. Questions pertinent to the procedure were solicited and answered. Dr. Mendosa was available to answer any questions with instruction to call clinic with any further questions.No assurances were given. Informed consent was obtained. The patient expressed good understanding and wished to proceed with scheduling the procedure.     ROS:   Constitutional: No fever, weakness, or fatigue.   Ear/Nose/Mouth/Throat: No nasal congestion or sore throat.   Respiratory: No shortness of breath or cough.   Cardiovascular: No chest pain, palpitations, or peripheral edema.   Gastrointestinal: No nausea, vomiting, or abdominal pain.   Genitourinary: No dysuria.  Musculoskeletal:  Right knee pain, swelling, loss of motion.    Past Surgical History:   Procedure Laterality Date    COLONOSCOPY      HIP ASPIRATION AND DRAINAGE      KNEE ARTHROPLASTY      torn meniscus    PULMONARY EMBOLISM SURGERY Bilateral     Haim filter    TONSILLECTOMY          Past Medical History:   Diagnosis Date    Anxiety disorder, unspecified     Clotting disorder     Depression     Digestive disorder     High cholesterol     Hypertension     Protein S deficiency         Objective:    There were no vitals filed for this visit.     Physical Exam:    Appearance: No distress, good color on room air. Alert and cooperative.  HEENT: Normocephalic. PERRLA EOM intact.   Lungs: Breathing unlabored.  Heart: Regular rate and rhythm.  Abdomen: Soft, non-tender.  No rebound tenderness.  Extremities: Examination  of the right lower extremity compartments are soft and warm. Skin is intact. There are no signs or symptoms of DVT or infection. There is no obvious joint effusion. There is no erythema. Tender to palpation along the medial joint line , right knee range of motion is 0-105. The knee is stable to exam with varus and valgus stressing. Negative anterior and posterior drawer. Negative Lachman´s.  Positive medial Jericho's test. Patella grind is positive, Negative for apprehension. Neurovascularly intact distally.  Skin: No rashes or open wounds.        Assessment:  1. Acute medial meniscal tear, right, initial encounter  - Place in Outpatient; Standing  - Full code; Standing  - Vital signs; Standing  - Insert peripheral IV; Standing  - Clip and Prep Other (please specifiy) (Operative site); Standing  - Cleanse with Chlorhexidine (CHG); Standing  - Diet NPO; Standing  - electrolyte-A infusion  - IP VTE LOW RISK PATIENT; Standing  - Place EDUARDO hose; Standing  - Place sequential compression device; Standing  - ceFAZolin (ANCEF) 2 g in dextrose 5 % (D5W) 50 mL IVPB  - CBC auto differential; Future  - Comprehensive metabolic panel; Future  - EKG 12-lead; Future  - Inpatient consult to Anesthesiology; Standing  - Case Request Operating Room: ARTHROSCOPY, KNEE, WITH MENISCECTOMY    2. Stress fracture of right tibia, initial encounter    3. Pre-op testing  - Place in Outpatient; Standing  - Full code; Standing  - Vital signs; Standing  - Insert peripheral IV; Standing  - Clip and Prep Other (please specifiy) (Operative site); Standing  - Cleanse with Chlorhexidine (CHG); Standing  - Diet NPO; Standing  - electrolyte-A infusion  - IP VTE LOW RISK PATIENT; Standing  - Place EDUARDO hose; Standing  - Place sequential compression device; Standing  - ceFAZolin (ANCEF) 2 g in dextrose 5 % (D5W) 50 mL IVPB  - CBC auto differential; Future  - Comprehensive metabolic panel; Future  - EKG 12-lead; Future  - Inpatient consult to  Anesthesiology; Standing  - Case Request Operating Room: ARTHROSCOPY, KNEE, WITH MENISCECTOMY       Plan:  Plan for right knee arthroscopy with medial meniscectomy at [facility]. The patient has been given preoperative instructions and prescriptions for post-operative medication. Post-operative appointment is scheduled for 2 weeks.  Patient is on Coumadin as she has a protein S deficiency with history of PE.  She had surgery for an IVC filter.  We will obtain clearance from Dr. Marshall.  Due to high-risk of clotting patient will stay on Coumadin for planned procedure.

## 2023-09-18 NOTE — H&P (VIEW-ONLY)
Admission History & Physical    Subjective:    CC: Results (MRI results of R knee. states no changes but she has started to feel like her pain has been radiating to his lower back and causes locking/spasms. )       HPI:  Sandra Angel presents today for preoperative evaluation for right knee arthroscopy with medial meniscectomy. I reviewed the indications for surgery. The risks and benefits of the proposed and alternative treatments were discussed with the patient. Questions pertinent to the procedure were solicited and answered. Dr. Mendosa was available to answer any questions with instruction to call clinic with any further questions.No assurances were given. Informed consent was obtained. The patient expressed good understanding and wished to proceed with scheduling the procedure.     ROS:   Constitutional: No fever, weakness, or fatigue.   Ear/Nose/Mouth/Throat: No nasal congestion or sore throat.   Respiratory: No shortness of breath or cough.   Cardiovascular: No chest pain, palpitations, or peripheral edema.   Gastrointestinal: No nausea, vomiting, or abdominal pain.   Genitourinary: No dysuria.  Musculoskeletal:  Right knee pain, swelling, loss of motion.    Past Surgical History:   Procedure Laterality Date    COLONOSCOPY      HIP ASPIRATION AND DRAINAGE      KNEE ARTHROPLASTY      torn meniscus    PULMONARY EMBOLISM SURGERY Bilateral     Haim filter    TONSILLECTOMY          Past Medical History:   Diagnosis Date    Anxiety disorder, unspecified     Clotting disorder     Depression     Digestive disorder     High cholesterol     Hypertension     Protein S deficiency         Objective:    There were no vitals filed for this visit.     Physical Exam:    Appearance: No distress, good color on room air. Alert and cooperative.  HEENT: Normocephalic. PERRLA EOM intact.   Lungs: Breathing unlabored.  Heart: Regular rate and rhythm.  Abdomen: Soft, non-tender.  No rebound tenderness.  Extremities: Examination  of the right lower extremity compartments are soft and warm. Skin is intact. There are no signs or symptoms of DVT or infection. There is no obvious joint effusion. There is no erythema. Tender to palpation along the medial joint line , right knee range of motion is 0-105. The knee is stable to exam with varus and valgus stressing. Negative anterior and posterior drawer. Negative Lachman´s.  Positive medial Jericho's test. Patella grind is positive, Negative for apprehension. Neurovascularly intact distally.  Skin: No rashes or open wounds.        Assessment:  1. Acute medial meniscal tear, right, initial encounter  - Place in Outpatient; Standing  - Full code; Standing  - Vital signs; Standing  - Insert peripheral IV; Standing  - Clip and Prep Other (please specifiy) (Operative site); Standing  - Cleanse with Chlorhexidine (CHG); Standing  - Diet NPO; Standing  - electrolyte-A infusion  - IP VTE LOW RISK PATIENT; Standing  - Place EDUARDO hose; Standing  - Place sequential compression device; Standing  - ceFAZolin (ANCEF) 2 g in dextrose 5 % (D5W) 50 mL IVPB  - CBC auto differential; Future  - Comprehensive metabolic panel; Future  - EKG 12-lead; Future  - Inpatient consult to Anesthesiology; Standing  - Case Request Operating Room: ARTHROSCOPY, KNEE, WITH MENISCECTOMY    2. Stress fracture of right tibia, initial encounter    3. Pre-op testing  - Place in Outpatient; Standing  - Full code; Standing  - Vital signs; Standing  - Insert peripheral IV; Standing  - Clip and Prep Other (please specifiy) (Operative site); Standing  - Cleanse with Chlorhexidine (CHG); Standing  - Diet NPO; Standing  - electrolyte-A infusion  - IP VTE LOW RISK PATIENT; Standing  - Place EDUARDO hose; Standing  - Place sequential compression device; Standing  - ceFAZolin (ANCEF) 2 g in dextrose 5 % (D5W) 50 mL IVPB  - CBC auto differential; Future  - Comprehensive metabolic panel; Future  - EKG 12-lead; Future  - Inpatient consult to  Anesthesiology; Standing  - Case Request Operating Room: ARTHROSCOPY, KNEE, WITH MENISCECTOMY       Plan:  Plan for right knee arthroscopy with medial meniscectomy at [facility]. The patient has been given preoperative instructions and prescriptions for post-operative medication. Post-operative appointment is scheduled for 2 weeks.  Patient is on Coumadin as she has a protein S deficiency with history of PE.  She had surgery for an IVC filter.  We will obtain clearance from Dr. Marshall.  Due to high-risk of clotting patient will stay on Coumadin for planned procedure.

## 2023-09-18 NOTE — TELEPHONE ENCOUNTER
Called and spoke with Dr. Guadalupe nurse in regards to patient status on clearance. They stated that she has an appointment this afternoon.

## 2023-09-19 ENCOUNTER — HOSPITAL ENCOUNTER (OUTPATIENT)
Facility: HOSPITAL | Age: 68
Discharge: HOME | End: 2023-09-19
Attending: ORTHOPAEDIC SURGERY | Admitting: ORTHOPAEDIC SURGERY
Payer: MEDICARE

## 2023-09-19 ENCOUNTER — ANESTHESIA (OUTPATIENT)
Dept: SURGERY | Facility: HOSPITAL | Age: 68
End: 2023-09-19
Payer: MEDICARE

## 2023-09-19 DIAGNOSIS — S83.241A ACUTE MEDIAL MENISCAL TEAR, RIGHT, INITIAL ENCOUNTER: Primary | ICD-10-CM

## 2023-09-19 DIAGNOSIS — Z01.818 PRE-OP TESTING: ICD-10-CM

## 2023-09-19 PROCEDURE — 27201423 OPTIME MED/SURG SUP & DEVICES STERILE SUPPLY: Performed by: ORTHOPAEDIC SURGERY

## 2023-09-19 PROCEDURE — 63600175 PHARM REV CODE 636 W HCPCS

## 2023-09-19 PROCEDURE — 29881 ARTHRS KNE SRG MNISECTMY M/L: CPT | Mod: AS,RT,,

## 2023-09-19 PROCEDURE — 29881 ARTHRS KNE SRG MNISECTMY M/L: CPT | Mod: RT,,, | Performed by: ORTHOPAEDIC SURGERY

## 2023-09-19 PROCEDURE — 71000016 HC POSTOP RECOV ADDL HR: Performed by: ORTHOPAEDIC SURGERY

## 2023-09-19 PROCEDURE — 29881 PR KNEE SCOPE SINGLE MENISECECTOMY: ICD-10-PCS | Mod: RT,,, | Performed by: ORTHOPAEDIC SURGERY

## 2023-09-19 PROCEDURE — D9220A PRA ANESTHESIA: Mod: ANES,,, | Performed by: STUDENT IN AN ORGANIZED HEALTH CARE EDUCATION/TRAINING PROGRAM

## 2023-09-19 PROCEDURE — 36000711: Performed by: ORTHOPAEDIC SURGERY

## 2023-09-19 PROCEDURE — 37000009 HC ANESTHESIA EA ADD 15 MINS: Performed by: ORTHOPAEDIC SURGERY

## 2023-09-19 PROCEDURE — 71000015 HC POSTOP RECOV 1ST HR: Performed by: ORTHOPAEDIC SURGERY

## 2023-09-19 PROCEDURE — 25000003 PHARM REV CODE 250

## 2023-09-19 PROCEDURE — 37000008 HC ANESTHESIA 1ST 15 MINUTES: Performed by: ORTHOPAEDIC SURGERY

## 2023-09-19 PROCEDURE — D9220A PRA ANESTHESIA: Mod: CRNA,,,

## 2023-09-19 PROCEDURE — D9220A PRA ANESTHESIA: ICD-10-PCS | Mod: ANES,,, | Performed by: STUDENT IN AN ORGANIZED HEALTH CARE EDUCATION/TRAINING PROGRAM

## 2023-09-19 PROCEDURE — 36000710: Performed by: ORTHOPAEDIC SURGERY

## 2023-09-19 PROCEDURE — D9220A PRA ANESTHESIA: ICD-10-PCS | Mod: CRNA,,,

## 2023-09-19 PROCEDURE — 71000033 HC RECOVERY, INTIAL HOUR: Performed by: ORTHOPAEDIC SURGERY

## 2023-09-19 PROCEDURE — 63600175 PHARM REV CODE 636 W HCPCS: Performed by: ORTHOPAEDIC SURGERY

## 2023-09-19 PROCEDURE — 29881 PR KNEE SCOPE SINGLE MENISECECTOMY: ICD-10-PCS | Mod: AS,RT,,

## 2023-09-19 PROCEDURE — 25000003 PHARM REV CODE 250: Performed by: ORTHOPAEDIC SURGERY

## 2023-09-19 RX ORDER — LIDOCAINE HYDROCHLORIDE 20 MG/ML
INJECTION INTRAVENOUS
Status: DISCONTINUED | OUTPATIENT
Start: 2023-09-19 | End: 2023-09-19

## 2023-09-19 RX ORDER — SODIUM CHLORIDE, SODIUM GLUCONATE, SODIUM ACETATE, POTASSIUM CHLORIDE AND MAGNESIUM CHLORIDE 30; 37; 368; 526; 502 MG/100ML; MG/100ML; MG/100ML; MG/100ML; MG/100ML
INJECTION, SOLUTION INTRAVENOUS CONTINUOUS
Status: DISCONTINUED | OUTPATIENT
Start: 2023-09-19 | End: 2023-09-19 | Stop reason: HOSPADM

## 2023-09-19 RX ORDER — HYDROCODONE BITARTRATE AND ACETAMINOPHEN 5; 325 MG/1; MG/1
1 TABLET ORAL EVERY 6 HOURS PRN
Qty: 16 TABLET | Refills: 0 | Status: SHIPPED | OUTPATIENT
Start: 2023-09-19

## 2023-09-19 RX ORDER — ONDANSETRON 2 MG/ML
4 INJECTION INTRAMUSCULAR; INTRAVENOUS EVERY 6 HOURS PRN
Status: DISCONTINUED | OUTPATIENT
Start: 2023-09-19 | End: 2023-09-19 | Stop reason: HOSPADM

## 2023-09-19 RX ORDER — GLYCOPYRROLATE 0.2 MG/ML
INJECTION INTRAMUSCULAR; INTRAVENOUS
Status: DISCONTINUED | OUTPATIENT
Start: 2023-09-19 | End: 2023-09-19

## 2023-09-19 RX ORDER — MORPHINE SULFATE 4 MG/ML
3 INJECTION, SOLUTION INTRAMUSCULAR; INTRAVENOUS
Status: DISCONTINUED | OUTPATIENT
Start: 2023-09-19 | End: 2023-09-19 | Stop reason: HOSPADM

## 2023-09-19 RX ORDER — PROPOFOL 10 MG/ML
INJECTION, EMULSION INTRAVENOUS
Status: DISCONTINUED | OUTPATIENT
Start: 2023-09-19 | End: 2023-09-19

## 2023-09-19 RX ORDER — METOCLOPRAMIDE HYDROCHLORIDE 5 MG/ML
10 INJECTION INTRAMUSCULAR; INTRAVENOUS EVERY 6 HOURS PRN
Status: DISCONTINUED | OUTPATIENT
Start: 2023-09-19 | End: 2023-09-19 | Stop reason: HOSPADM

## 2023-09-19 RX ORDER — HYDROCODONE BITARTRATE AND ACETAMINOPHEN 5; 325 MG/1; MG/1
1 TABLET ORAL EVERY 4 HOURS PRN
Status: DISCONTINUED | OUTPATIENT
Start: 2023-09-19 | End: 2023-09-19 | Stop reason: HOSPADM

## 2023-09-19 RX ORDER — KETOROLAC TROMETHAMINE 30 MG/ML
15 INJECTION, SOLUTION INTRAMUSCULAR; INTRAVENOUS ONCE
Status: DISCONTINUED | OUTPATIENT
Start: 2023-09-19 | End: 2023-09-19 | Stop reason: HOSPADM

## 2023-09-19 RX ORDER — LIDOCAINE HYDROCHLORIDE 10 MG/ML
INJECTION INFILTRATION; PERINEURAL
Status: DISCONTINUED | OUTPATIENT
Start: 2023-09-19 | End: 2023-09-19 | Stop reason: HOSPADM

## 2023-09-19 RX ORDER — SODIUM CHLORIDE 9 MG/ML
INJECTION, SOLUTION INTRAVENOUS CONTINUOUS
Status: DISCONTINUED | OUTPATIENT
Start: 2023-09-19 | End: 2023-09-19 | Stop reason: HOSPADM

## 2023-09-19 RX ORDER — METHOCARBAMOL 750 MG/1
750 TABLET, FILM COATED ORAL EVERY 6 HOURS PRN
Status: DISCONTINUED | OUTPATIENT
Start: 2023-09-19 | End: 2023-09-19 | Stop reason: HOSPADM

## 2023-09-19 RX ORDER — METHYLPREDNISOLONE ACETATE 80 MG/ML
INJECTION, SUSPENSION INTRA-ARTICULAR; INTRALESIONAL; INTRAMUSCULAR; SOFT TISSUE
Status: DISCONTINUED | OUTPATIENT
Start: 2023-09-19 | End: 2023-09-19 | Stop reason: HOSPADM

## 2023-09-19 RX ORDER — ONDANSETRON 2 MG/ML
INJECTION INTRAMUSCULAR; INTRAVENOUS
Status: DISCONTINUED | OUTPATIENT
Start: 2023-09-19 | End: 2023-09-19

## 2023-09-19 RX ORDER — FENTANYL CITRATE 50 UG/ML
INJECTION, SOLUTION INTRAMUSCULAR; INTRAVENOUS
Status: DISCONTINUED | OUTPATIENT
Start: 2023-09-19 | End: 2023-09-19

## 2023-09-19 RX ORDER — MAG HYDROX/ALUMINUM HYD/SIMETH 200-200-20
30 SUSPENSION, ORAL (FINAL DOSE FORM) ORAL EVERY 6 HOURS PRN
Status: DISCONTINUED | OUTPATIENT
Start: 2023-09-19 | End: 2023-09-19 | Stop reason: HOSPADM

## 2023-09-19 RX ORDER — LIDOCAINE HYDROCHLORIDE 10 MG/ML
INJECTION INFILTRATION; PERINEURAL
Status: DISCONTINUED
Start: 2023-09-19 | End: 2023-09-19 | Stop reason: HOSPADM

## 2023-09-19 RX ORDER — DEXAMETHASONE SODIUM PHOSPHATE 4 MG/ML
INJECTION, SOLUTION INTRA-ARTICULAR; INTRALESIONAL; INTRAMUSCULAR; INTRAVENOUS; SOFT TISSUE
Status: DISCONTINUED | OUTPATIENT
Start: 2023-09-19 | End: 2023-09-19

## 2023-09-19 RX ORDER — CALCIUM CARBONATE 200(500)MG
500 TABLET,CHEWABLE ORAL EVERY 8 HOURS PRN
Status: DISCONTINUED | OUTPATIENT
Start: 2023-09-19 | End: 2023-09-19 | Stop reason: HOSPADM

## 2023-09-19 RX ORDER — METHYLPREDNISOLONE ACETATE 80 MG/ML
INJECTION, SUSPENSION INTRA-ARTICULAR; INTRALESIONAL; INTRAMUSCULAR; SOFT TISSUE
Status: DISCONTINUED
Start: 2023-09-19 | End: 2023-09-19 | Stop reason: HOSPADM

## 2023-09-19 RX ADMIN — LIDOCAINE HYDROCHLORIDE 80 MG: 20 INJECTION INTRAVENOUS at 11:09

## 2023-09-19 RX ADMIN — FENTANYL CITRATE 50 MCG: 50 INJECTION, SOLUTION INTRAMUSCULAR; INTRAVENOUS at 12:09

## 2023-09-19 RX ADMIN — ONDANSETRON HYDROCHLORIDE 4 MG: 2 SOLUTION INTRAMUSCULAR; INTRAVENOUS at 12:09

## 2023-09-19 RX ADMIN — DEXAMETHASONE SODIUM PHOSPHATE 4 MG: 4 INJECTION, SOLUTION INTRA-ARTICULAR; INTRALESIONAL; INTRAMUSCULAR; INTRAVENOUS; SOFT TISSUE at 12:09

## 2023-09-19 RX ADMIN — HYDROCODONE BITARTRATE AND ACETAMINOPHEN 1 TABLET: 5; 325 TABLET ORAL at 01:09

## 2023-09-19 RX ADMIN — SODIUM CHLORIDE, SODIUM GLUCONATE, SODIUM ACETATE, POTASSIUM CHLORIDE AND MAGNESIUM CHLORIDE: 526; 502; 368; 37; 30 INJECTION, SOLUTION INTRAVENOUS at 11:09

## 2023-09-19 RX ADMIN — GLYCOPYRROLATE 0.1 MG: 0.2 INJECTION INTRAMUSCULAR; INTRAVENOUS at 11:09

## 2023-09-19 RX ADMIN — PROPOFOL 120 MG: 10 INJECTION, EMULSION INTRAVENOUS at 11:09

## 2023-09-19 RX ADMIN — CEFAZOLIN 2 G: 2 INJECTION, POWDER, FOR SOLUTION INTRAMUSCULAR; INTRAVENOUS at 12:09

## 2023-09-19 RX ADMIN — METHOCARBAMOL 750 MG: 750 TABLET ORAL at 01:09

## 2023-09-19 NOTE — DISCHARGE INSTRUCTIONS
[facility] [place]. [address]  [phone]    DIET    YOUR FIRST MEAL SHOULD BE LIQUID: I.E. SOUPS, JELLO, JUICE. IF YOUR LIQUID MEAL IS TOLERATED WELL THEN YOU MAY PROGRESS TO A SMALL LIGHT MEAL.   IF NAUSEA AND VOMITING OCCUR RETURN TO THE LIQUID DIET AND PROGRESS TO A NORMAL SOLID DIET SLOWLY.  IF THE NAUSEA AND VOMITING DOES NOT STOP, NOTIFY YOUR HEALTH CARE PROVIDER.      GENERAL ANESTHESIA OR SEDATION    DO NOT DRIVE OR PARTICIPATE IN ANY ACTIVITIES THAT REQUIRE COORDINATION FOR THE NEXT 24 HOURS: I.E. SWIMMING, BIKING, OPERATING HEAVY MACHINERY, COOKING, USING POWER TOOLS, CLIMBING LADDERS.   FOR THE NEXT 24 HOURS DO NOT: DRIVE, DRINK ALCOHOL, MAKE ANY IMPORTANT DECISIONS OR SIGN ANY LEGAL DOCUMENTS.   STAY WITH AN ADULT DURING THE 24 HOURS AFTER YOUR SURGERY.   DRINK ENOUGH FLUIDS TO KEEP YOUR URINE CLEAR TO PALE YELLOW.      PREVENTING CONSTIPATION AFTER SURGERY    EAT FOOD HIGH IN FIBER AND DRINK PLENTY OF FLUIDS, ESPECIALLY WATER.   YOU MAY TAKE AN OVER THE COUNTER FIBER SUPPLEMENT OR STOOL SOFTENER AS DIRECTED ON THE PACKAGE.   STAYING MOBILE, IF POSSIBLE, HELPS TO PREVENT CONSTIPATION.  CONTACT YOUR DOCTOR IF YOU HAVE NOT HAD A BOWEL MOVEMENT IN 3 DAYS AFTER SURGERY.       INFECTION CONTROL    KEEP YOUR DRESSING CLEAN, DRY AND INTACT.   FOLLOW PHYSICIAN INSTRUCTIONS REGARDING REMOVAL OF DRESSING.  DO NOT TOUCH SURGICAL SITE OR APPLY LOTIONS, POWDERS, CREAMS OR OINTMENTS ON YOUR INCISION UNLESS INSTRUCTED TO DO SO BY YOUR HEALTH CARE PROVIDER.  ONCE THE DRESSING HAS BEEN REMOVED, CHECK THE INCISION SITE DAILY FOR: INCREASED REDNESS, SWELLING, FLUID OR BLOOD, WARMTH, PUS, FOUL SMELL OR INCREASED PAIN.      DEEP VEIN THROMBOSIS (DVT)    A DVT IS A BLOOD CLOT THAT CAN DEVELOP IN THE DEEP AND LARGER VEINS OF THE LEG, ARM OR PELVIS.   RISK FACTORS INCLUDE SITTING OR LYING FOR LONG PERIODS OF TIME. THIS INCLUDES RECOVERING FROM SURGERY.  PREVENTION INCLUDES:  AVOID SITTING STILL FOR LONG PERIODS WITHOUT MOVING YOUR LEGS, DO NOT SMOKE AND IF POSSIBLE AVOID MEDICATIONS THAT CONTAIN ESTROGEN.   SIGNS AND SYMPTOMS THAT SHOULD BE REPORTED IMMEDIATELY INCLUDE: SWELLING IN AN ARM OR LEG, WARMTH, REDNESS OR PAIN IN ONE AREA OF THE LEG OR ARM THAT DOES NOT COME FROM THE INCISION. IF THE CLOT IS IN YOUR LEG, THE SYMPTOMS WILL BE WORSE WHEN STANDING OR WALKING.   SIGNS OF A PULMONARY EMBOLISM OR PE (A CLOT THAT MOVED TO YOUR LUNG): SHORTNESS OF BREATH, COUGHING , ESPECIALLY IF ACCOMPANIED WITH BLOODY MUCUS, CHEST PAIN OR RAPID HEART RATE.  IF YOU EXPERIENCE THESE SYMPTOMS, YOU SHOULD GET EMERGENCY TREATMENT RIGHT AWAY. DO NOT WAIT TO SEE IF THESE SYMPTOMS WILL GO AWAY. DO NOT DRIVE YOURSELF TO THE HOSPITAL.       CONTACT YOUR HEALTH CARE PROVIDER IF:    YOU HAVE REDNESS, SWELLING OR PAIN AROUND YOUR INCISION.  YOUR INCISION FEELS WARM TO THE TOUCH OR IS LEAKING EXCESSIVE FLUID/ BLOOD.  YOUR SUTURES OR STAPLES HAVE COME UNDONE.  YOU HAVE A TEMPERATURE .5 OR GREATER.  YOU ARE NOT ABLE TO URINATE WITHIN 6 HOURS AFTER SURGERY.  YOU HAVE UNRESOLVED NAUSEA AND VOMITING.       SEEK IMMEDIATE MEDICAL CARE IF:    YOU HAVE PERSISTENT NAUSEA AND VOMITING.   YOU ARE UNABLE TO EAT OR DRINK.   YOU HAVE DIFFICULTY SPEAKING AND/ OR BREATHING.  YOUR SKIN COLOR APPEARS BLUE OR GRAY.  YOU HAVE A RED STREAK COMING FROM YOUR INCISION.  YOUR INCISION BLEEDS THROUGH THE DRESSING AND DOES NOT STOP WITH GENTLY PRESSURE.  YOU HAVE SEVERE PAIN THAT DOES NOT DECREASE WITH YOUR MEDICATIONS.

## 2023-09-19 NOTE — TRANSFER OF CARE
"Anesthesia Transfer of Care Note    Patient: Sandra Angel    Procedure(s) Performed: Procedure(s) (LRB):  ARTHROSCOPY, KNEE, WITH MENISCECTOMY (Right)    Patient location: PACU    Anesthesia Type: general    Transport from OR: Transported from OR on room air with adequate spontaneous ventilation    Post pain: adequate analgesia    Post assessment: no apparent anesthetic complications    Post vital signs: stable    Level of consciousness: sedated    Nausea/Vomiting: no nausea/vomiting    Complications: none    Transfer of care protocol was followed      Last vitals:   Visit Vitals  /82   Pulse 63   Temp 35.8 °C (96.5 °F) (Tympanic)   Resp 18   Ht 5' 6" (1.676 m)   Wt 104.6 kg (230 lb 9.6 oz)   SpO2 97%   Breastfeeding No   BMI 37.22 kg/m²     "

## 2023-09-19 NOTE — ANESTHESIA PREPROCEDURE EVALUATION
09/19/2023  Sandra Angel is a 68 y.o., female.    Other Medical History   Clotting disorder Protein S deficiency   Hypertension High cholesterol   Anxiety disorder, unspecified Depression   Digestive disorder      Surgical History  PULMONARY EMBOLISM SURGERY TONSILLECTOMY   HIP ASPIRATION AND DRAINAGE KNEE ARTHROPLASTY   COLONOSCOPY        Pre-op Assessment    I have reviewed the Patient Summary Reports.     I have reviewed the Nursing Notes. I have reviewed the NPO Status.   I have reviewed the Medications.     Review of Systems  Anesthesia Hx:   Denies Personal Hx of Anesthesia complications.   Hematology/Oncology:        Hematology Comments: Protein S def   EENT/Dental:EENT/Dental Normal   Cardiovascular:   Exercise tolerance: good Hypertension    Hepatic/GI:  Hepatic/GI Normal    Musculoskeletal:   Arthritis     Endocrine:  Obesity / BMI > 30  Psych:   Psychiatric History anxiety depression          Physical Exam  General: Well nourished, Cooperative and Alert    Airway:  Mallampati: II   Mouth Opening: Normal  TM Distance: Normal  Tongue: Normal    Chest/Lungs:  Normal Respiratory Rate        Anesthesia Plan  Type of Anesthesia, risks & benefits discussed:    Anesthesia Type: Gen Supraglottic Airway  Intra-op Monitoring Plan: Standard ASA Monitors  Post Op Pain Control Plan: multimodal analgesia  Induction:  IV  Informed Consent: Informed consent signed with the Patient and all parties understand the risks and agree with anesthesia plan.  All questions answered.   ASA Score: 3  Day of Surgery Review of History & Physical: H&P Update referred to the surgeon/provider.    Ready For Surgery From Anesthesia Perspective.     .

## 2023-09-19 NOTE — PLAN OF CARE
Preparing for discharge. Vss. Inst reviewed. Ice pack to right knee. . Has cane/ walker for home use.

## 2023-09-19 NOTE — OP NOTE
DATE OF SURGERY:  9/19/2023      SURGEON:  Josh Mendosa MD    ASSISTANT:  JUAN CARLOS Ernst    ASSISTANT ATTESTATION: PA was essential throughout key and critical portions of the case including knee manipulation and primary closure of wounds.    HOSPITAL:  [facility]     PREOPERATIVE DIAGNOSIS:  right knee medial meniscus tear.      POSTOPERATIVE DIAGNOSIS:  Same as above.      PROCEDURE:  right knee arthroscopy with medial meniscectomy.      ANESTHESIA:  LMA.      IV FLUIDS:  Per Anesthesia      ESTIMATED BLOOD LOSS:  Less than 10 cc.      INSTRUMENT COUNT:  Correct.      COMPLICATIONS:  None.      CONDITION:  Stable to the PACU.      INDICATIONS FOR PROCEDURE:  Sandra Angel  is a 68 y.o. old female with continued pain and loss of motion of the right  knee.  The patient has failed conservative treatment.  The patient had an MRI demonstrating the above findings.  The risks, benefits, and alternatives were discussed with the patient.  Detailed questions were answered, and informed consent was obtained.      PROCEDURE IN DETAIL:  Patient was found in preoperative holding by Anesthesia, prepped and draped for surgery.  The patient was taken to the operating room and placed on the operating table in supine position.  All bony prominences were well padded.  Time-out was called identifying correct patient, correct procedure, correct site, and all were in agreement.  The patient underwent LMA anesthesia without complications.  The patient was then prepped and draped in the normal sterile fashion, leaving the affected lower extremity exposed for surgery.  A well-padded tourniquet was placed on the right upper thigh.  The patient received preoperative antibiotics.  After exsanguination of the affected lower extremity, tourniquet was inflated. Approximate tourniquet time was 16 minutes at 300. Anterior lateral portal was then made through a 1 cm incision.  Camera was introduced into the suprapatellar pouch.  Next, medial and  lateral gutters were inspected, with no loose bodies.  The patella tracked appropriately.  Patient did have significant cartilage changes of the patellofemoral joint.   Camera was introduced in the medial compartment where the anterior medial portal was made through a 1 cm incision.  Next, probing of the medial meniscus demonstrated a large unstable tear of the body posterior body and posterior horn of the medial meniscus.  With the use of a straight biter and 3.5 shaver, patient underwent a medial meniscectomy.  After this was done, the remaining meniscus was probed and found to be stable without any signs of additional tear or instability.  Camera was introduced in the intercondylar notch.  The ACL and PCL were inspected and found to be intact.  Camera was introduced in the lateral compartment.  Lateral meniscus was probed and found to stable without any signs of tear or instability.  She did have mild cartilage changes of the lateral compartment.  Camera was introduced back in the suprapatellar pouch.  All excess fluid was removed.  Tourniquet was released.  Hemostasis was achieved.  Copious irrigation was used to wash the 2 incisions.  Incisions were closed with 2-0 nylon suture in standard horizontal mattress configuration.  Xeroform, 4 x 4's, soft tissue dressing, Ace wrap placed on the affected lower extremity. The patient was awoken by Anesthesia and brought to PACU in stable condition.              ______________________________  Josh Mendosa MD

## 2023-09-19 NOTE — ANESTHESIA POSTPROCEDURE EVALUATION
Anesthesia Post Evaluation    Patient: Sandra Angel    Procedure(s) Performed: Procedure(s) (LRB):  ARTHROSCOPY, KNEE, WITH MENISCECTOMY (Right)    Final Anesthesia Type: general      Patient location during evaluation: PACU  Patient participation: Yes- Able to Participate  Level of consciousness: awake and alert  Post-procedure vital signs: reviewed and stable  Pain management: adequate  Airway patency: patent    PONV status at discharge: No PONV  Anesthetic complications: no      Respiratory status: unassisted  Hydration status: euvolemic  Follow-up not needed.          Vitals Value Taken Time   /89 09/19/23 1447   Temp 36.2 °C (97.2 °F) 09/19/23 1300   Pulse 68 09/19/23 1456   Resp 18 09/19/23 1456   SpO2 94 % 09/19/23 1456   Vitals shown include unvalidated device data.      Event Time   Out of Recovery 13:23:00         Pain/Tenzin Score: Pain Rating Prior to Med Admin: 5 (9/19/2023  2:21 PM)  Pain Rating Post Med Admin: 4 (9/19/2023  3:26 PM)  Tenzin Score: 10 (9/19/2023  1:15 PM)

## 2023-09-19 NOTE — INTERVAL H&P NOTE
The patient has been examined and the H&P has been reviewed:    I concur with the findings and changes have been noted since the H&P was written:  Clearance received from Dr. Guadalupe.    Surgery risks, benefits and alternative options discussed and understood by patient/family.  Patient does understand that due to her continued Coumadin use she is at a higher risk for bleeding, bruising, swelling.  DVT/PE precautions given with strict instructions to call clinic or report to ER.          There are no hospital problems to display for this patient.

## 2023-09-19 NOTE — BRIEF OP NOTE
[facility] Orthopaedics - Periop Services  Brief Operative Note    Surgery Date: 9/19/2023     Surgeon(s) and Role:     * Josh Mendosa MD - Primary    Assisting Surgeon: None    Pre-op Diagnosis:  Acute medial meniscal tear, right, initial encounter [S83.241A]  Pre-op testing [Z01.818]    Post-op Diagnosis:  Post-Op Diagnosis Codes:     * Acute medial meniscal tear, right, initial encounter [S83.241A]     * Pre-op testing [Z01.818]    Procedure(s) (LRB):  ARTHROSCOPY, KNEE, WITH MENISCECTOMY (Right)    Anesthesia: General    Operative Findings: R MMT    Estimated Blood Loss: * No values recorded between 9/19/2023 12:23 PM and 9/19/2023 12:47 PM *         Specimens:   Specimen (24h ago, onward)      None              Discharge Note    OUTCOME: Patient tolerated treatment/procedure well without complication and is now ready for discharge.    DISPOSITION: Home or Self Care    FINAL DIAGNOSIS:  Acute medial meniscal tear, right, initial encounter    FOLLOWUP: In clinic    DISCHARGE INSTRUCTIONS:    Discharge Procedure Orders   Diet general   Order Comments: As prior to surgery     Keep surgical extremity elevated     Ice to affected area     No driving, operating heavy equipment or signing legal documents while taking pain medication     Other restrictions (specify):   Order Comments: Weight Bearing: as tolerated  ROM: as tolerated     Remove dressing in 72 hours     Wound care routine (specify)   Order Comments: Wound care routine: keep dressing clean, dry, and intact until POD #3. Ok to remove and shower after. No soaking or topical creams.     Call MD for:  temperature >100.4     Call MD for:  persistent nausea and vomiting     Call MD for:  severe uncontrolled pain     Call MD for:  difficulty breathing, headache or visual disturbances     Call MD for:  redness, tenderness, or signs of infection (pain, swelling, redness, odor or green/yellow discharge around incision site)     Call MD for:  hives     Call  MD for:  persistent dizziness or light-headedness     Call MD for:  extreme fatigue     Activity as tolerated     Shower on day dressing removed (No bath)     Weight bearing as tolerated

## 2023-09-19 NOTE — ANESTHESIA PROCEDURE NOTES
Intubation    Date/Time: 9/19/2023 12:00 PM    Performed by: Ismael Nino CRNA  Authorized by: Joey Shelton MD    Intubation:     Induction:  Intravenous    Intubated:  Postinduction    Mask Ventilation:  Not attempted    Attempts:  1    Attempted By:  CRNA    Difficult Airway Encountered?: No      Complications:  None    Airway Device:  Supraglottic airway/LMA    Airway Device Size:  4.0    Style/Cuff Inflation:  Cuffed    Secured at:  The lips    Placement Verified By:  Capnometry    Complicating Factors:  None    Findings Post-Intubation:  BS equal bilateral and atraumatic/condition of teeth unchanged

## 2023-09-20 VITALS
WEIGHT: 230.63 LBS | TEMPERATURE: 97 F | SYSTOLIC BLOOD PRESSURE: 162 MMHG | HEIGHT: 66 IN | RESPIRATION RATE: 18 BRPM | OXYGEN SATURATION: 96 % | BODY MASS INDEX: 37.06 KG/M2 | DIASTOLIC BLOOD PRESSURE: 89 MMHG | HEART RATE: 68 BPM

## 2023-10-05 ENCOUNTER — OFFICE VISIT (OUTPATIENT)
Dept: ORTHOPEDICS | Facility: CLINIC | Age: 68
End: 2023-10-05
Payer: MEDICARE

## 2023-10-05 VITALS
DIASTOLIC BLOOD PRESSURE: 72 MMHG | BODY MASS INDEX: 37.28 KG/M2 | HEIGHT: 66 IN | HEART RATE: 92 BPM | SYSTOLIC BLOOD PRESSURE: 124 MMHG | WEIGHT: 232 LBS

## 2023-10-05 DIAGNOSIS — S83.241A ACUTE MEDIAL MENISCAL TEAR, RIGHT, INITIAL ENCOUNTER: Primary | ICD-10-CM

## 2023-10-05 PROCEDURE — 99024 POSTOP FOLLOW-UP VISIT: CPT | Mod: POP,,,

## 2023-10-05 PROCEDURE — 99024 PR POST-OP FOLLOW-UP VISIT: ICD-10-PCS | Mod: POP,,,

## 2023-10-05 NOTE — PROGRESS NOTES
Subjective:    CC: Post-op Evaluation (Right knee scope with medial menisectomy SX 9/19/23 states shes doing very good not taking any pain medications)       HPI:  Patient comes in today for first postop visit. Status post right knee medial meniscectomy on 09/19/2023. Approximately 2 weeks out.  Pain is very minimal if any today.  Not currently taking any pain medication.  Patient is on Coumadin. Ambulating unassisted. The patient denies any signs of infection.  -she does have some bruising but is much improved.  Denies any recurrent mechanical symptoms.  No new complaints.    ROS: Refer to HPI for pertinent ROS. All other 12 point systems negative.    Objective:    Vitals:    10/05/23 0840   BP: 124/72   Pulse: 92        Physical Exam:  Right lower extremity compartments are soft and warm.  There are no signs or symptoms of DVT or infection.  The patient's incisions are well healed. Sutures have been removed.  She does have some expected bruising about the anterior knee that is resolving; likely secondary to Coumadin.  The patient is nontender to palpation about the knee. Knee ROM is 0-120 degrees. Stable to stressing. Negative Jericho's. Neurovascularly intact distally.      Images:   Intraoperative Images Reviewed and discussed with patient.    Assessment:  1. Acute medial meniscal tear, right, initial encounter       Plan:  Physical exam and intraoperative findings discussed with the patient. Wound care instructions given. The patient is progressing well. Instructed to continue weightbearing as tolerated. I would like to see the patient back with any problems or difficulties.    Follow up: Follow up in about 4 weeks (around 11/2/2023).

## 2024-07-23 ENCOUNTER — HOSPITAL ENCOUNTER (INPATIENT)
Facility: HOSPITAL | Age: 69
LOS: 2 days | Discharge: HOME | DRG: 194 | End: 2024-07-25
Attending: EMERGENCY MEDICINE | Admitting: INTERNAL MEDICINE
Payer: MEDICARE

## 2024-07-23 DIAGNOSIS — J18.9 COMMUNITY ACQUIRED PNEUMONIA OF RIGHT LOWER LOBE OF LUNG: Primary | ICD-10-CM

## 2024-07-23 DIAGNOSIS — R53.1 GENERALIZED WEAKNESS: ICD-10-CM

## 2024-07-23 DIAGNOSIS — E87.1 HYPONATREMIA: ICD-10-CM

## 2024-07-23 PROBLEM — F17.200 TOBACCO DEPENDENCY: Status: ACTIVE | Noted: 2024-07-23

## 2024-07-23 PROBLEM — S83.241A ACUTE MEDIAL MENISCAL TEAR, RIGHT, INITIAL ENCOUNTER: Status: RESOLVED | Noted: 2023-09-19 | Resolved: 2024-07-23

## 2024-07-23 LAB
ALBUMIN SERPL-MCNC: 3.1 G/DL (ref 3.4–4.8)
ALBUMIN/GLOB SERPL: 1 RATIO (ref 1.1–2)
ALP SERPL-CCNC: 73 UNIT/L (ref 40–150)
ALT SERPL-CCNC: 42 UNIT/L (ref 0–55)
ANION GAP SERPL CALC-SCNC: 10 MEQ/L
AST SERPL-CCNC: 62 UNIT/L (ref 5–34)
BACTERIA #/AREA URNS AUTO: ABNORMAL /HPF
BASOPHILS # BLD AUTO: 0.01 X10(3)/MCL
BASOPHILS NFR BLD AUTO: 0.2 %
BILIRUB SERPL-MCNC: 1.2 MG/DL
BILIRUB UR QL STRIP.AUTO: NEGATIVE
BUN SERPL-MCNC: 19.2 MG/DL (ref 9.8–20.1)
CALCIUM SERPL-MCNC: 8.6 MG/DL (ref 8.4–10.2)
CHLORIDE SERPL-SCNC: 95 MMOL/L (ref 98–107)
CLARITY UR: CLEAR
CO2 SERPL-SCNC: 25 MMOL/L (ref 23–31)
COLOR UR AUTO: YELLOW
CREAT SERPL-MCNC: 0.95 MG/DL (ref 0.55–1.02)
CREAT/UREA NIT SERPL: 20
EOSINOPHIL # BLD AUTO: 0 X10(3)/MCL (ref 0–0.9)
EOSINOPHIL NFR BLD AUTO: 0 %
ERYTHROCYTE [DISTWIDTH] IN BLOOD BY AUTOMATED COUNT: 13.1 % (ref 11.5–17)
FLUAV AG UPPER RESP QL IA.RAPID: NOT DETECTED
FLUBV AG UPPER RESP QL IA.RAPID: NOT DETECTED
GFR SERPLBLD CREATININE-BSD FMLA CKD-EPI: >60 ML/MIN/1.73/M2
GLOBULIN SER-MCNC: 3.1 GM/DL (ref 2.4–3.5)
GLUCOSE SERPL-MCNC: 107 MG/DL (ref 82–115)
GLUCOSE UR QL STRIP: NEGATIVE
HCT VFR BLD AUTO: 35.5 % (ref 37–47)
HGB BLD-MCNC: 12.3 G/DL (ref 12–16)
HGB UR QL STRIP: ABNORMAL
IMM GRANULOCYTES # BLD AUTO: 0.03 X10(3)/MCL (ref 0–0.04)
IMM GRANULOCYTES NFR BLD AUTO: 0.5 %
INR PPP: 2.1 (ref 2–3)
KETONES UR QL STRIP: NEGATIVE
LACTATE SERPL-SCNC: 0.9 MMOL/L (ref 0.5–2.2)
LEUKOCYTE ESTERASE UR QL STRIP: ABNORMAL
LYMPHOCYTES # BLD AUTO: 0.96 X10(3)/MCL (ref 0.6–4.6)
LYMPHOCYTES NFR BLD AUTO: 14.9 %
MAGNESIUM SERPL-MCNC: 1.8 MG/DL (ref 1.6–2.6)
MCH RBC QN AUTO: 31.7 PG (ref 27–31)
MCHC RBC AUTO-ENTMCNC: 34.6 G/DL (ref 33–36)
MCV RBC AUTO: 91.5 FL (ref 80–94)
MONOCYTES # BLD AUTO: 0.62 X10(3)/MCL (ref 0.1–1.3)
MONOCYTES NFR BLD AUTO: 9.6 %
NEUTROPHILS # BLD AUTO: 4.83 X10(3)/MCL (ref 2.1–9.2)
NEUTROPHILS NFR BLD AUTO: 74.8 %
NITRITE UR QL STRIP: NEGATIVE
NRBC BLD AUTO-RTO: 0 %
PH UR STRIP: 6.5 [PH]
PLATELET # BLD AUTO: 80 X10(3)/MCL (ref 130–400)
PMV BLD AUTO: 12.3 FL (ref 7.4–10.4)
POTASSIUM SERPL-SCNC: 3.6 MMOL/L (ref 3.5–5.1)
PROT SERPL-MCNC: 6.2 GM/DL (ref 5.8–7.6)
PROT UR QL STRIP: ABNORMAL
PROTHROMBIN TIME: 24 SECONDS (ref 11.7–14.5)
RBC # BLD AUTO: 3.88 X10(6)/MCL (ref 4.2–5.4)
RBC #/AREA URNS AUTO: ABNORMAL /HPF
SARS-COV-2 RNA RESP QL NAA+PROBE: NOT DETECTED
SODIUM SERPL-SCNC: 130 MMOL/L (ref 136–145)
SP GR UR STRIP.AUTO: 1 (ref 1–1.03)
SQUAMOUS #/AREA URNS AUTO: ABNORMAL /HPF
TROPONIN I SERPL-MCNC: 0.02 NG/ML (ref 0–0.04)
TSH SERPL-ACNC: 1.55 UIU/ML (ref 0.35–4.94)
UROBILINOGEN UR STRIP-ACNC: 0.2
WBC # BLD AUTO: 6.45 X10(3)/MCL (ref 4.5–11.5)
WBC #/AREA URNS AUTO: ABNORMAL /HPF

## 2024-07-23 PROCEDURE — 99900031 HC PATIENT EDUCATION (STAT)

## 2024-07-23 PROCEDURE — 36415 COLL VENOUS BLD VENIPUNCTURE: CPT | Performed by: INTERNAL MEDICINE

## 2024-07-23 PROCEDURE — 99285 EMERGENCY DEPT VISIT HI MDM: CPT | Mod: 25

## 2024-07-23 PROCEDURE — 81003 URINALYSIS AUTO W/O SCOPE: CPT | Performed by: EMERGENCY MEDICINE

## 2024-07-23 PROCEDURE — 25000003 PHARM REV CODE 250: Performed by: EMERGENCY MEDICINE

## 2024-07-23 PROCEDURE — 94640 AIRWAY INHALATION TREATMENT: CPT

## 2024-07-23 PROCEDURE — 80053 COMPREHEN METABOLIC PANEL: CPT | Performed by: EMERGENCY MEDICINE

## 2024-07-23 PROCEDURE — 87040 BLOOD CULTURE FOR BACTERIA: CPT | Performed by: EMERGENCY MEDICINE

## 2024-07-23 PROCEDURE — 25000242 PHARM REV CODE 250 ALT 637 W/ HCPCS: Performed by: INTERNAL MEDICINE

## 2024-07-23 PROCEDURE — 84443 ASSAY THYROID STIM HORMONE: CPT | Performed by: EMERGENCY MEDICINE

## 2024-07-23 PROCEDURE — 85025 COMPLETE CBC W/AUTO DIFF WBC: CPT | Performed by: EMERGENCY MEDICINE

## 2024-07-23 PROCEDURE — 96360 HYDRATION IV INFUSION INIT: CPT

## 2024-07-23 PROCEDURE — 0240U COVID/FLU A&B PCR: CPT | Performed by: EMERGENCY MEDICINE

## 2024-07-23 PROCEDURE — 63600175 PHARM REV CODE 636 W HCPCS: Performed by: EMERGENCY MEDICINE

## 2024-07-23 PROCEDURE — 84484 ASSAY OF TROPONIN QUANT: CPT | Performed by: EMERGENCY MEDICINE

## 2024-07-23 PROCEDURE — 83735 ASSAY OF MAGNESIUM: CPT | Performed by: EMERGENCY MEDICINE

## 2024-07-23 PROCEDURE — 83605 ASSAY OF LACTIC ACID: CPT | Performed by: EMERGENCY MEDICINE

## 2024-07-23 PROCEDURE — 85610 PROTHROMBIN TIME: CPT | Performed by: INTERNAL MEDICINE

## 2024-07-23 PROCEDURE — 81001 URINALYSIS AUTO W/SCOPE: CPT | Performed by: EMERGENCY MEDICINE

## 2024-07-23 PROCEDURE — 11000001 HC ACUTE MED/SURG PRIVATE ROOM

## 2024-07-23 PROCEDURE — 94761 N-INVAS EAR/PLS OXIMETRY MLT: CPT

## 2024-07-23 RX ORDER — ENOXAPARIN SODIUM 100 MG/ML
1 INJECTION SUBCUTANEOUS EVERY 12 HOURS
Status: DISCONTINUED | OUTPATIENT
Start: 2024-07-24 | End: 2024-07-24

## 2024-07-23 RX ORDER — ENOXAPARIN SODIUM 100 MG/ML
40 INJECTION SUBCUTANEOUS EVERY 24 HOURS
Status: DISCONTINUED | OUTPATIENT
Start: 2024-07-23 | End: 2024-07-23

## 2024-07-23 RX ORDER — TALC
6 POWDER (GRAM) TOPICAL NIGHTLY PRN
Status: DISCONTINUED | OUTPATIENT
Start: 2024-07-23 | End: 2024-07-25 | Stop reason: HOSPADM

## 2024-07-23 RX ORDER — WARFARIN 2.5 MG/1
2.5 TABLET ORAL
Status: DISCONTINUED | OUTPATIENT
Start: 2024-07-24 | End: 2024-07-25 | Stop reason: HOSPADM

## 2024-07-23 RX ORDER — BISACODYL 10 MG/1
10 SUPPOSITORY RECTAL DAILY PRN
Status: DISCONTINUED | OUTPATIENT
Start: 2024-07-23 | End: 2024-07-25 | Stop reason: HOSPADM

## 2024-07-23 RX ORDER — DOCUSATE SODIUM 100 MG/1
100 CAPSULE, LIQUID FILLED ORAL 2 TIMES DAILY
Status: DISCONTINUED | OUTPATIENT
Start: 2024-07-23 | End: 2024-07-25 | Stop reason: HOSPADM

## 2024-07-23 RX ORDER — DULOXETIN HYDROCHLORIDE 30 MG/1
30 CAPSULE, DELAYED RELEASE ORAL NIGHTLY
Status: DISCONTINUED | OUTPATIENT
Start: 2024-07-24 | End: 2024-07-25 | Stop reason: HOSPADM

## 2024-07-23 RX ORDER — IPRATROPIUM BROMIDE AND ALBUTEROL SULFATE 2.5; .5 MG/3ML; MG/3ML
3 SOLUTION RESPIRATORY (INHALATION)
Status: DISCONTINUED | OUTPATIENT
Start: 2024-07-23 | End: 2024-07-25 | Stop reason: HOSPADM

## 2024-07-23 RX ORDER — IBUPROFEN 200 MG
1 TABLET ORAL DAILY
Status: DISCONTINUED | OUTPATIENT
Start: 2024-07-24 | End: 2024-07-25 | Stop reason: HOSPADM

## 2024-07-23 RX ORDER — POLYETHYLENE GLYCOL 3350 17 G/17G
17 POWDER, FOR SOLUTION ORAL 2 TIMES DAILY
Status: DISCONTINUED | OUTPATIENT
Start: 2024-07-23 | End: 2024-07-25 | Stop reason: HOSPADM

## 2024-07-23 RX ORDER — ONDANSETRON HYDROCHLORIDE 2 MG/ML
4 INJECTION, SOLUTION INTRAVENOUS EVERY 8 HOURS PRN
Status: DISCONTINUED | OUTPATIENT
Start: 2024-07-23 | End: 2024-07-25 | Stop reason: HOSPADM

## 2024-07-23 RX ORDER — WARFARIN SODIUM 5 MG/1
5 TABLET ORAL
Status: DISCONTINUED | OUTPATIENT
Start: 2024-07-25 | End: 2024-07-25 | Stop reason: HOSPADM

## 2024-07-23 RX ORDER — SODIUM CHLORIDE AND POTASSIUM CHLORIDE 150; 900 MG/100ML; MG/100ML
INJECTION, SOLUTION INTRAVENOUS CONTINUOUS
Status: DISCONTINUED | OUTPATIENT
Start: 2024-07-23 | End: 2024-07-24

## 2024-07-23 RX ORDER — LORAZEPAM 1 MG/1
2 TABLET ORAL NIGHTLY PRN
Status: DISCONTINUED | OUTPATIENT
Start: 2024-07-23 | End: 2024-07-25 | Stop reason: HOSPADM

## 2024-07-23 RX ORDER — SODIUM CHLORIDE 0.9 % (FLUSH) 0.9 %
10 SYRINGE (ML) INJECTION
Status: DISCONTINUED | OUTPATIENT
Start: 2024-07-23 | End: 2024-07-25 | Stop reason: HOSPADM

## 2024-07-23 RX ADMIN — POTASSIUM CHLORIDE AND SODIUM CHLORIDE: 900; 150 INJECTION, SOLUTION INTRAVENOUS at 03:07

## 2024-07-23 RX ADMIN — IPRATROPIUM BROMIDE AND ALBUTEROL SULFATE 3 ML: 2.5; .5 SOLUTION RESPIRATORY (INHALATION) at 08:07

## 2024-07-23 RX ADMIN — CEFTRIAXONE SODIUM 1 G: 1 INJECTION, POWDER, FOR SOLUTION INTRAMUSCULAR; INTRAVENOUS at 01:07

## 2024-07-23 RX ADMIN — AZITHROMYCIN MONOHYDRATE 500 MG: 500 INJECTION, POWDER, LYOPHILIZED, FOR SOLUTION INTRAVENOUS at 03:07

## 2024-07-23 RX ADMIN — SODIUM CHLORIDE, POTASSIUM CHLORIDE, SODIUM LACTATE AND CALCIUM CHLORIDE 1000 ML: 600; 310; 30; 20 INJECTION, SOLUTION INTRAVENOUS at 10:07

## 2024-07-23 RX ADMIN — ENOXAPARIN SODIUM 40 MG: 40 INJECTION SUBCUTANEOUS at 04:07

## 2024-07-23 NOTE — H&P
[facility]    [facility] Medicine History & Physical Examination       Patient Name: Sandra Angel  MRN: [id number]  Patient Class: IP- Inpatient   Admission Date: 7/23/2024   Admitting Physician: ANISA Service   Length of Stay: 0  Attending Physician: Vitor Sosa MD  Primary Care Provider: Loyd Guadalupe MD  Face-to-Face encounter date: 07/23/2024    Code Status: Full Code    Chief Complaint: Fever (Pt c/o having fever for the past eight days, along with difficulty urinating and confusion.)          HISTORY OF PRESENT ILLNESS:   Sandra Angel is a 68 y.o. female who  has a past medical history of Anxiety disorder, unspecified, Clotting disorder, Depression, Digestive disorder, High cholesterol, Hypertension, and Protein S deficiency. The patient presented to [facility] on 7/23/2024 with a primary complaint of recurrent fever for about 8 days. She was in [place] at a conference when she became lightheaded. She didn't pass out. She began having fevers, nausea and vomiting. The n/v lasted about 4 days. It was bilious and was about 2x /day. She had a poor appetite and had trouble holding down food when tried to eat. She also had decreased urination during the past week. She was confused, lethargic at times and had occasional hallucinations. She did urinate today. She has had a BM in 7 days. She fell twice in the past week. She didn't take her temp but reported fever, chills and sweats. An OTC covid test was negative. She denies sick contacts. She also had a sore throat. Her family brought her in due to her ongoing symptoms. She was able to give me a full history. CXR in the ER shows RLL pneumonia.    PAST MEDICAL HISTORY:     Past Medical History:   Diagnosis Date    Anxiety disorder, unspecified     Clotting disorder     Depression     Digestive disorder     High cholesterol     Hypertension     Protein S deficiency        PAST SURGICAL HISTORY:     Past Surgical History:    Procedure Laterality Date    COLONOSCOPY      HAIM FILTER PLACEMENT      HIP ASPIRATION AND DRAINAGE      KNEE ARTHROPLASTY      torn meniscus    KNEE ARTHROSCOPY W/ MENISCECTOMY Right 09/19/2023    Procedure: ARTHROSCOPY, KNEE, WITH MENISCECTOMY;  Surgeon: Josh Mendosa MD;  Location: [facility];  Service: Orthopedics;  Laterality: Right;    PULMONARY EMBOLISM SURGERY Bilateral     Ahim filter    TONSILLECTOMY         ALLERGIES:   Escitalopram oxalate and Sulfa (sulfonamide antibiotics)    FAMILY HISTORY:   family history includes Arthritis in her mother; Cancer in her brother; Diabetes in her brother, father, and mother; Heart attack in her brother; Kidney disease in her father; Liver cancer in her brother; No Known Problems in her sister; Protein S deficiency in her daughter; Rheum arthritis in her mother; Stroke in her brother and brother.    SOCIAL HISTORY:     Social History     Tobacco Use    Smoking status: Every Day     Current packs/day: 1.00     Average packs/day: 1 pack/day for 10.0 years (10.0 ttl pk-yrs)     Types: Cigarettes    Smokeless tobacco: Never   Substance Use Topics    Alcohol use: Not Currently        HOME MEDICATIONS:     Prior to Admission medications    Medication Sig Start Date End Date Taking? Authorizing Provider   atorvastatin (LIPITOR) 20 MG tablet Take 20 mg by mouth every evening. 11/21/22  Yes Provider, Historical   DULoxetine (CYMBALTA) 30 MG capsule Take 30 mg by mouth nightly. 1/24/19  Yes Provider, Historical   HYDROcodone-acetaminophen (NORCO) 5-325 mg per tablet Take 1 tablet by mouth every 6 (six) hours as needed for Pain. 9/19/23  Yes Kristen Boogie PA-C   LORazepam (ATIVAN) 2 MG Tab Take 2 mg by mouth every evening.   Yes Provider, Historical   warfarin (COUMADIN) 5 MG tablet Take 5 mg by mouth nightly.   Yes Provider, Historical   pantoprazole (PROTONIX) 40 MG tablet Take 1 tablet by mouth nightly. 8/24/22 9/19/23  Provider, Historical       REVIEW OF  SYSTEMS:   Except as documented, all other systems reviewed and negative   Review of Systems   Constitutional:  Positive for chills, diaphoresis, fever and malaise/fatigue.   HENT:  Negative for congestion.    Respiratory:  Negative for cough, sputum production, shortness of breath and wheezing.    Cardiovascular:  Negative for chest pain, orthopnea and leg swelling.   Gastrointestinal:  Positive for abdominal pain, constipation, nausea and vomiting. Negative for diarrhea.   Genitourinary:  Negative for dysuria.   Musculoskeletal:  Positive for myalgias.   Neurological:  Positive for dizziness.   Endo/Heme/Allergies:  Does not bruise/bleed easily.         PHYSICAL EXAM:     VITAL SIGNS: 24 HRS MIN & MAX LAST   Temp  Min: 98.7 °F (37.1 °C)  Max: 98.8 °F (37.1 °C) 98.7 °F (37.1 °C)   BP  Min: 127/69  Max: 146/69 137/68   Pulse  Min: 88  Max: 92  88   Resp  Min: 20  Max: 20 20   SpO2  Min: 91 %  Max: 95 % (!) 91 %       General appearance: Well-developed, obese female in no apparent distress.  HENT: Atraumatic head. Moist mucous membranes of oral cavity.  Eyes: Normal extraocular movements.   Neck: Supple.   Lungs: Clear to auscultation bilaterally. No wheezing present.   Heart: Regular rate and rhythm. S1 and S2 present with no murmurs/gallop/rub. No pedal edema. No JVD present.   Abdomen: Soft, non-distended, mild tender RLQ on palp. No rebound tenderness/guarding. Bowel sounds are decreased.   Extremities: No cyanosis, clubbing, or edema.  Skin: No Rash.   Neuro: Motor and sensory exams grossly intact. Good tone.   Psych/mental status: Appropriate mood and affect. A&Ox3 Responds appropriately to questions.     LABS AND IMAGING:     Recent Labs   Lab 07/23/24  0952   WBC 6.45   RBC 3.88*   HGB 12.3   HCT 35.5*   MCV 91.5   MCH 31.7*   MCHC 34.6   RDW 13.1   PLT 80*   MPV 12.3*       Recent Labs   Lab 07/23/24  0952   *   K 3.6   CL 95*   CO2 25   BUN 19.2   CREATININE 0.95   CALCIUM 8.6   MG 1.80   ALBUMIN 3.1*    ALKPHOS 73   ALT 42   AST 62*   BILITOT 1.2       Microbiology Results (last 7 days)       Procedure Component Value Units Date/Time    Blood culture #1 **CANNOT BE ORDERED STAT** [[id number]] Collected: 07/23/24 1330    Order Status: Resulted Specimen: Blood from Antecubital, Left Updated: 07/23/24 1335    Blood culture #2 **CANNOT BE ORDERED STAT** [[id number]] Collected: 07/23/24 1330    Order Status: Resulted Specimen: Blood from Arm, Left Updated: 07/23/24 1335             X-Ray Chest AP Portable  Narrative: EXAMINATION:  XR CHEST AP PORTABLE    CLINICAL HISTORY:  cough;    COMPARISON:  Chest x-ray dated 08/03/2023    FINDINGS:  The heart is stable in size.  There is a consolidative opacity in the right lower lung.  The left lung is clear.  There is no pleural effusion or visible pneumothorax.  Impression: Findings concerning for a right-sided pneumonia.    Electronically signed by: Tangela Becker  Date:    07/23/2024  Time:    10:18      Recent Results (from the past 24 hour(s))   Comprehensive metabolic panel    Collection Time: 07/23/24  9:52 AM   Result Value Ref Range    Sodium 130 (L) 136 - 145 mmol/L    Potassium 3.6 3.5 - 5.1 mmol/L    Chloride 95 (L) 98 - 107 mmol/L    CO2 25 23 - 31 mmol/L    Glucose 107 82 - 115 mg/dL    Blood Urea Nitrogen 19.2 9.8 - 20.1 mg/dL    Creatinine 0.95 0.55 - 1.02 mg/dL    Calcium 8.6 8.4 - 10.2 mg/dL    Protein Total 6.2 5.8 - 7.6 gm/dL    Albumin 3.1 (L) 3.4 - 4.8 g/dL    Globulin 3.1 2.4 - 3.5 gm/dL    Albumin/Globulin Ratio 1.0 (L) 1.1 - 2.0 ratio    Bilirubin Total 1.2 <=1.5 mg/dL    ALP 73 40 - 150 unit/L    ALT 42 0 - 55 unit/L    AST 62 (H) 5 - 34 unit/L    eGFR >60 mL/min/1.73/m2    Anion Gap 10.0 mEq/L    BUN/Creatinine Ratio 20    COVID/FLU A&B PCR    Collection Time: 07/23/24  9:52 AM   Result Value Ref Range    Influenza A PCR Not Detected Not Detected    Influenza B PCR Not Detected Not Detected    SARS-CoV-2 PCR Not Detected Not Detected, Negative    Lactic acid, plasma    Collection Time: 07/23/24  9:52 AM   Result Value Ref Range    Lactic Acid Level 0.9 0.5 - 2.2 mmol/L   Magnesium    Collection Time: 07/23/24  9:52 AM   Result Value Ref Range    Magnesium Level 1.80 1.60 - 2.60 mg/dL   Troponin I    Collection Time: 07/23/24  9:52 AM   Result Value Ref Range    Troponin-I 0.016 0.000 - 0.045 ng/mL   TSH    Collection Time: 07/23/24  9:52 AM   Result Value Ref Range    TSH 1.551 0.350 - 4.940 uIU/mL   CBC with Differential    Collection Time: 07/23/24  9:52 AM   Result Value Ref Range    WBC 6.45 4.50 - 11.50 x10(3)/mcL    RBC 3.88 (L) 4.20 - 5.40 x10(6)/mcL    Hgb 12.3 12.0 - 16.0 g/dL    Hct 35.5 (L) 37.0 - 47.0 %    MCV 91.5 80.0 - 94.0 fL    MCH 31.7 (H) 27.0 - 31.0 pg    MCHC 34.6 33.0 - 36.0 g/dL    RDW 13.1 11.5 - 17.0 %    Platelet 80 (L) 130 - 400 x10(3)/mcL    MPV 12.3 (H) 7.4 - 10.4 fL    Neut % 74.8 %    Lymph % 14.9 %    Mono % 9.6 %    Eos % 0.0 %    Basophil % 0.2 %    Lymph # 0.96 0.6 - 4.6 x10(3)/mcL    Neut # 4.83 2.1 - 9.2 x10(3)/mcL    Mono # 0.62 0.1 - 1.3 x10(3)/mcL    Eos # 0.00 0 - 0.9 x10(3)/mcL    Baso # 0.01 <=0.2 x10(3)/mcL    IG# 0.03 0 - 0.04 x10(3)/mcL    IG% 0.5 %    NRBC% 0.0 %   Urinalysis, Reflex to Urine Culture    Collection Time: 07/23/24 12:19 PM    Specimen: Urine   Result Value Ref Range    Color, UA Yellow Yellow, Light-Yellow, Dark Yellow, Kesha, Straw    Appearance, UA Clear Clear    Specific Gravity, UA 1.005 1.005 - 1.030    pH, UA 6.5 5.0 - 8.5    Protein, UA Trace (A) Negative    Glucose, UA Negative Negative, Normal    Ketones, UA Negative Negative    Blood, UA Trace (A) Negative    Bilirubin, UA Negative Negative    Urobilinogen, UA 0.2 0.2, 1.0, Normal    Nitrites, UA Negative Negative    Leukocyte Esterase, UA Trace (A) Negative   Urinalysis, Microscopic    Collection Time: 07/23/24 12:19 PM   Result Value Ref Range    Bacteria, UA Few (A) None Seen, Rare, Occasional /HPF    RBC, UA 0-5 None Seen, 0-2,  3-5, 0-5 /HPF    WBC, UA 3-5 None Seen, 0-2, 3-5, 0-5 /HPF    Squamous Epithelial Cells, UA Few (A) None Seen, Rare, Occasional, Occ /HPF     __________________________________________________________________________  INPATIENT LIST OF MEDICATIONS     Scheduled Meds:   azithromycin  500 mg Intravenous Q24H    [START ON 7/24/2024] cefTRIAXone (Rocephin) IV (PEDS and ADULTS)  1 g Intravenous Q24H    docusate sodium  100 mg Oral BID    [START ON 7/24/2024] DULoxetine  30 mg Oral Nightly    enoxparin  40 mg Subcutaneous Daily    [START ON 7/24/2024] nicotine  1 patch Transdermal Daily    polyethylene glycol  17 g Oral BID    [START ON 7/24/2024] warfarin  2.5 mg Oral Every Mon, Wed, Fri    [START ON 7/25/2024] warfarin  5 mg Oral Every Tues, Thurs, Sat, Sun               ASSESSMENT & PLAN:     Right lower lobe pneumonia  Fever due to above  Acute encephalopathy  Constipation  HTN  HLP  Elevated lfts  Hx of Protein S deficiency  Hx of VTE, s/p IVC filter  Tobacco abuse    Plan  Mildly elevated LFTs, hold statin  Resume coumadin, follow INR  Hold lovenox once INR >2  Cont abx  Follow blood cultures  Monitor INR  Her mentation seems to have improved -monitor  Review and resume appropriate home meds  The patient last smoked 8 days ago. I counseled her on the need to quit.  Consult therapy  Cont ivf  Check Kub in am      Vitor Sosa MD   07/23/2024

## 2024-07-23 NOTE — ED PROVIDER NOTES
Encounter Date: 7/23/2024       History     Chief Complaint   Patient presents with    Fever     Pt c/o having fever for the past eight days, along with difficulty urinating and confusion.     The history is provided by the patient and the spouse.   Fever  Primary symptoms of the febrile illness include fever, fatigue, cough, nausea, vomiting and arthralgias. Primary symptoms do not include shortness of breath, dysuria or rash. The current episode started more than 1 week ago. This is a new problem. The problem has not changed since onset.  The maximum temperature recorded prior to her arrival was unknown. The temperature was taken by no thermometer.   The fatigue began 6 to 7 days ago. The fatigue has been unchanged since its onset.   The cough began 6 to 7 days ago. The cough is non-productive.   Nausea began 3 to 5 days ago.   Arthralgias began 2 to 7 days ago. The arthralgias are located in the right knee and left knee.   Recently returned from [place].  Has fallen 3X's.  Feels like her memory is foggy.    Review of patient's allergies indicates:   Allergen Reactions    Escitalopram oxalate Other (See Comments)     Daytime sleepiness    Sulfa (sulfonamide antibiotics) Hives and Rash     Past Medical History:   Diagnosis Date    Anxiety disorder, unspecified     Clotting disorder     Depression     Digestive disorder     High cholesterol     Hypertension     Protein S deficiency      Past Surgical History:   Procedure Laterality Date    COLONOSCOPY      HIP ASPIRATION AND DRAINAGE      KNEE ARTHROPLASTY      torn meniscus    KNEE ARTHROSCOPY W/ MENISCECTOMY Right 9/19/2023    Procedure: ARTHROSCOPY, KNEE, WITH MENISCECTOMY;  Surgeon: Josh Mendosa MD;  Location: [facility];  Service: Orthopedics;  Laterality: Right;    PULMONARY EMBOLISM SURGERY Bilateral     Haim filter    TONSILLECTOMY       Family History   Problem Relation Name Age of Onset    Rheum arthritis Mother Gaye Kothari      Arthritis Mother Gaye Kothari     Diabetes Mother Gaye Kothari     Diabetes Father JIGAR Henriquez     Kidney disease Father JIGAR Henriquez     No Known Problems Sister      Stroke Brother Angel Henriquez     Heart attack Brother Angel Henriquez     Liver cancer Brother Angel Henriquez     Cancer Brother Angel Henriquez     Diabetes Brother Alexis Henriquez     Stroke Brother Alexis Henriquez      Social History     Tobacco Use    Smoking status: Every Day     Current packs/day: 1.00     Average packs/day: 1 pack/day for 10.0 years (10.0 ttl pk-yrs)     Types: Cigarettes    Smokeless tobacco: Never   Substance Use Topics    Alcohol use: Not Currently    Drug use: Never     Review of Systems   Constitutional:  Positive for fatigue and fever.   HENT:  Negative for sore throat.    Respiratory:  Positive for cough. Negative for shortness of breath.    Cardiovascular:  Negative for chest pain.   Gastrointestinal:  Positive for nausea and vomiting.   Genitourinary:  Negative for dysuria.   Musculoskeletal:  Positive for arthralgias. Negative for back pain.   Skin:  Negative for rash.   Neurological:  Negative for weakness.   Hematological:  Does not bruise/bleed easily.       Physical Exam     Initial Vitals [07/23/24 0913]   BP Pulse Resp Temp SpO2   (!) 146/69 91 20 98.8 °F (37.1 °C) 95 %      MAP       --         Physical Exam    Nursing note and vitals reviewed.  Constitutional: She appears well-developed and well-nourished.   HENT:   Head: Normocephalic and atraumatic.   Right Ear: External ear normal.   Left Ear: External ear normal.   Eyes: Conjunctivae and EOM are normal. Pupils are equal, round, and reactive to light.   Neck: Neck supple.   Normal range of motion.  Cardiovascular:  Normal rate, regular rhythm, normal heart sounds and intact distal pulses.           Pulmonary/Chest: Breath sounds normal.   Abdominal: Abdomen is soft. Bowel sounds are normal.   obese   Musculoskeletal:         General:  Normal range of motion.      Cervical back: Normal range of motion and neck supple.     Neurological: She is alert and oriented to person, place, and time. GCS score is 15. GCS eye subscore is 4. GCS verbal subscore is 5. GCS motor subscore is 6.   Skin: Skin is warm and dry. Capillary refill takes less than 2 seconds.   Psychiatric: She has a normal mood and affect. Her behavior is normal. Judgment and thought content normal.         ED Course   Procedures  Labs Reviewed   COMPREHENSIVE METABOLIC PANEL - Abnormal       Result Value    Sodium 130 (*)     Potassium 3.6      Chloride 95 (*)     CO2 25      Glucose 107      Blood Urea Nitrogen 19.2      Creatinine 0.95      Calcium 8.6      Protein Total 6.2      Albumin 3.1 (*)     Globulin 3.1      Albumin/Globulin Ratio 1.0 (*)     Bilirubin Total 1.2      ALP 73      ALT 42      AST 62 (*)     eGFR >60      Anion Gap 10.0      BUN/Creatinine Ratio 20     URINALYSIS, REFLEX TO URINE CULTURE - Abnormal    Color, UA Yellow      Appearance, UA Clear      Specific Gravity, UA 1.005      pH, UA 6.5      Protein, UA Trace (*)     Glucose, UA Negative      Ketones, UA Negative      Blood, UA Trace (*)     Bilirubin, UA Negative      Urobilinogen, UA 0.2      Nitrites, UA Negative      Leukocyte Esterase, UA Trace (*)    CBC WITH DIFFERENTIAL - Abnormal    WBC 6.45      RBC 3.88 (*)     Hgb 12.3      Hct 35.5 (*)     MCV 91.5      MCH 31.7 (*)     MCHC 34.6      RDW 13.1      Platelet 80 (*)     MPV 12.3 (*)     Neut % 74.8      Lymph % 14.9      Mono % 9.6      Eos % 0.0      Basophil % 0.2      Lymph # 0.96      Neut # 4.83      Mono # 0.62      Eos # 0.00      Baso # 0.01      IG# 0.03      IG% 0.5      NRBC% 0.0     URINALYSIS, MICROSCOPIC - Abnormal    Bacteria, UA Few (*)     RBC, UA 0-5      WBC, UA 3-5      Squamous Epithelial Cells, UA Few (*)    COVID/FLU A&B PCR - Normal    Influenza A PCR Not Detected      Influenza B PCR Not Detected      SARS-CoV-2 PCR Not  Detected      Narrative:     The Xpert Xpress SARS-CoV-2/FLU/RSV plus is a rapid, multiplexed real-time PCR test intended for the simultaneous qualitative detection and differentiation of SARS-CoV-2, Influenza A, Influenza B, and respiratory syncytial virus (RSV) viral RNA in either nasopharyngeal swab or nasal swab specimens.         LACTIC ACID, PLASMA - Normal    Lactic Acid Level 0.9     MAGNESIUM - Normal    Magnesium Level 1.80     TROPONIN I - Normal    Troponin-I 0.016     TSH - Normal    TSH 1.551     BLOOD CULTURE [facility]   BLOOD CULTURE [facility]   CBC W/ AUTO DIFFERENTIAL    Narrative:     The following orders were created for panel order CBC auto differential.  Procedure                               Abnormality         Status                     ---------                               -----------         ------                     CBC with Differential[[id number]]       Abnormal            Final result                 Please view results for these tests on the individual orders.          Imaging Results              X-Ray Chest AP Portable (Final result)  Result time 07/23/24 10:18:02      Final result by Tangela Becker MD (07/23/24 10:18:02)                   Impression:      Findings concerning for a right-sided pneumonia.      Electronically signed by: Tangela Becker  Date:    07/23/2024  Time:    10:18               Narrative:    EXAMINATION:  XR CHEST AP PORTABLE    CLINICAL HISTORY:  cough;    COMPARISON:  Chest x-ray dated 08/03/2023    FINDINGS:  The heart is stable in size.  There is a consolidative opacity in the right lower lung.  The left lung is clear.  There is no pleural effusion or visible pneumothorax.                                       Medications   cefTRIAXone (Rocephin) 1 g in D5W 100 mL IVPB (MB+) (has no administration in time range)   lactated ringers bolus 1,000 mL (1,000 mLs Intravenous New Bag 7/23/24 1028)     Medical Decision Making  Amount and/or Complexity of Data  Reviewed  Labs: ordered. Decision-making details documented in ED Course.  Radiology: ordered and independent interpretation performed. Decision-making details documented in ED Course.  ECG/medicine tests: ordered and independent interpretation performed. Decision-making details documented in ED Course.    Differential includes:  COVID, flu, UTI, pneumonia, viral syndrome, dehydration, depression, electrolyte disturbance.  Will obtain CBC, CMP, lactate, COVID/flu, UA, CXR and give IVF bolus.  Consider admission for dehydration, possible infectious process.           ED Course as of 07/23/24 1336   Tue Jul 23, 2024   1332 I spoke with Dr. Sosa ([facility]) regarding admission [CL]      ED Course User Index  [CL] Teodoro Costa MD                           Clinical Impression:  Final diagnoses:  [J18.9] Community acquired pneumonia of right lower lobe of lung (Primary)  [R53.1] Generalized weakness  [E87.1] Hyponatremia          ED Disposition Condition    Admit Stable

## 2024-07-24 LAB
ANION GAP SERPL CALC-SCNC: 8 MEQ/L
BASOPHILS # BLD AUTO: 0.01 X10(3)/MCL
BASOPHILS NFR BLD AUTO: 0.2 %
BUN SERPL-MCNC: 12.7 MG/DL (ref 9.8–20.1)
CALCIUM SERPL-MCNC: 8.4 MG/DL (ref 8.4–10.2)
CHLORIDE SERPL-SCNC: 103 MMOL/L (ref 98–107)
CO2 SERPL-SCNC: 26 MMOL/L (ref 23–31)
CREAT SERPL-MCNC: 0.75 MG/DL (ref 0.55–1.02)
CREAT/UREA NIT SERPL: 17
EOSINOPHIL # BLD AUTO: 0.01 X10(3)/MCL (ref 0–0.9)
EOSINOPHIL NFR BLD AUTO: 0.2 %
ERYTHROCYTE [DISTWIDTH] IN BLOOD BY AUTOMATED COUNT: 13.1 % (ref 11.5–17)
GFR SERPLBLD CREATININE-BSD FMLA CKD-EPI: >60 ML/MIN/1.73/M2
GLUCOSE SERPL-MCNC: 104 MG/DL (ref 82–115)
HCT VFR BLD AUTO: 31.2 % (ref 37–47)
HGB BLD-MCNC: 10.8 G/DL (ref 12–16)
IMM GRANULOCYTES # BLD AUTO: 0.03 X10(3)/MCL (ref 0–0.04)
IMM GRANULOCYTES NFR BLD AUTO: 0.7 %
INR PPP: 2 (ref 2–3)
LYMPHOCYTES # BLD AUTO: 1.3 X10(3)/MCL (ref 0.6–4.6)
LYMPHOCYTES NFR BLD AUTO: 31.2 %
MCH RBC QN AUTO: 32 PG (ref 27–31)
MCHC RBC AUTO-ENTMCNC: 34.6 G/DL (ref 33–36)
MCV RBC AUTO: 92.3 FL (ref 80–94)
MONOCYTES # BLD AUTO: 0.59 X10(3)/MCL (ref 0.1–1.3)
MONOCYTES NFR BLD AUTO: 14.1 %
NEUTROPHILS # BLD AUTO: 2.23 X10(3)/MCL (ref 2.1–9.2)
NEUTROPHILS NFR BLD AUTO: 53.6 %
NRBC BLD AUTO-RTO: 0 %
PLATELET # BLD AUTO: 145 X10(3)/MCL (ref 130–400)
PMV BLD AUTO: 11.1 FL (ref 7.4–10.4)
POTASSIUM SERPL-SCNC: 3.3 MMOL/L (ref 3.5–5.1)
PROTHROMBIN TIME: 23.2 SECONDS (ref 11.7–14.5)
RBC # BLD AUTO: 3.38 X10(6)/MCL (ref 4.2–5.4)
SODIUM SERPL-SCNC: 137 MMOL/L (ref 136–145)
WBC # BLD AUTO: 4.17 X10(3)/MCL (ref 4.5–11.5)

## 2024-07-24 PROCEDURE — 85025 COMPLETE CBC W/AUTO DIFF WBC: CPT | Performed by: INTERNAL MEDICINE

## 2024-07-24 PROCEDURE — 97162 PT EVAL MOD COMPLEX 30 MIN: CPT

## 2024-07-24 PROCEDURE — 99900031 HC PATIENT EDUCATION (STAT)

## 2024-07-24 PROCEDURE — 25000242 PHARM REV CODE 250 ALT 637 W/ HCPCS: Performed by: INTERNAL MEDICINE

## 2024-07-24 PROCEDURE — 25000003 PHARM REV CODE 250: Performed by: INTERNAL MEDICINE

## 2024-07-24 PROCEDURE — 63600175 PHARM REV CODE 636 W HCPCS: Performed by: INTERNAL MEDICINE

## 2024-07-24 PROCEDURE — 80048 BASIC METABOLIC PNL TOTAL CA: CPT | Performed by: INTERNAL MEDICINE

## 2024-07-24 PROCEDURE — 94799 UNLISTED PULMONARY SVC/PX: CPT

## 2024-07-24 PROCEDURE — 36415 COLL VENOUS BLD VENIPUNCTURE: CPT | Performed by: INTERNAL MEDICINE

## 2024-07-24 PROCEDURE — 94640 AIRWAY INHALATION TREATMENT: CPT

## 2024-07-24 PROCEDURE — 94761 N-INVAS EAR/PLS OXIMETRY MLT: CPT

## 2024-07-24 PROCEDURE — 11000001 HC ACUTE MED/SURG PRIVATE ROOM

## 2024-07-24 PROCEDURE — 85610 PROTHROMBIN TIME: CPT | Performed by: INTERNAL MEDICINE

## 2024-07-24 RX ADMIN — DOCUSATE SODIUM 100 MG: 100 CAPSULE, LIQUID FILLED ORAL at 09:07

## 2024-07-24 RX ADMIN — AZITHROMYCIN MONOHYDRATE 500 MG: 500 INJECTION, POWDER, LYOPHILIZED, FOR SOLUTION INTRAVENOUS at 04:07

## 2024-07-24 RX ADMIN — DULOXETINE HYDROCHLORIDE 30 MG: 30 CAPSULE, DELAYED RELEASE ORAL at 08:07

## 2024-07-24 RX ADMIN — IPRATROPIUM BROMIDE AND ALBUTEROL SULFATE 3 ML: 2.5; .5 SOLUTION RESPIRATORY (INHALATION) at 08:07

## 2024-07-24 RX ADMIN — DOCUSATE SODIUM 100 MG: 100 CAPSULE, LIQUID FILLED ORAL at 08:07

## 2024-07-24 RX ADMIN — ENOXAPARIN SODIUM 100 MG: 100 INJECTION SUBCUTANEOUS at 11:07

## 2024-07-24 RX ADMIN — CEFTRIAXONE SODIUM 1 G: 1 INJECTION, POWDER, FOR SOLUTION INTRAMUSCULAR; INTRAVENOUS at 09:07

## 2024-07-24 RX ADMIN — POLYETHYLENE GLYCOL 3350 17 G: 17 POWDER, FOR SOLUTION ORAL at 09:07

## 2024-07-24 RX ADMIN — IPRATROPIUM BROMIDE AND ALBUTEROL SULFATE 3 ML: 2.5; .5 SOLUTION RESPIRATORY (INHALATION) at 07:07

## 2024-07-24 RX ADMIN — IPRATROPIUM BROMIDE AND ALBUTEROL SULFATE 3 ML: 2.5; .5 SOLUTION RESPIRATORY (INHALATION) at 01:07

## 2024-07-24 RX ADMIN — WARFARIN SODIUM 2.5 MG: 2.5 TABLET ORAL at 06:07

## 2024-07-24 RX ADMIN — POTASSIUM CHLORIDE AND SODIUM CHLORIDE: 900; 150 INJECTION, SOLUTION INTRAVENOUS at 02:07

## 2024-07-24 NOTE — PLAN OF CARE
Problem: Physical Therapy  Goal: Physical Therapy Goal  Description: Pt will improve functional independence by performing:    Bed mobility: SBA  Sit to stand: SBA with LRAD  Bed to chair t/f: SBA with Stand Step  with LRAD  Ambulation x 200'  with SBA with LRAD  1 Step (Curb): Min A  with LRAD  Ramp: CGA with LRAD    Outcome: Progressing

## 2024-07-24 NOTE — PT/OT/SLP EVAL
"Physical Therapy Evaluation    Patient Name:  Sandra Angel   MRN:  [id number]    Recommendations:     Discharge Recommendations: Low Intensity Therapy   Discharge Equipment Recommendations: cane, straight, other (see comments) (TBD)   Barriers to discharge: None    Assessment:     Sandra Angel is a 68 y.o. female admitted with a medical diagnosis of Community acquired pneumonia of right lower lobe of lung.  She presents with the following impairments/functional limitations: weakness, impaired endurance, gait instability, impaired balance .    Rehab Prognosis: Good; patient would benefit from acute skilled PT services to address these deficits and reach maximum level of function.    Recent Surgery: * No surgery found *      Plan:     During this hospitalization, patient to be seen daily (QD-BID) to address the identified rehab impairments via gait training, therapeutic activities, therapeutic exercises and progress toward the following goals:    Plan of Care Expires:  07/30/24    Subjective     Chief Complaint: fatigue   Patient/Family Comments/goals: Pt states she "feels unsteady" and is not back at her baseline yet  Pain/Comfort:  Pain Rating 1: 0/10  Pain Rating Post-Intervention 1: 0/10    Patients cultural, spiritual, Orthodox conflicts given the current situation:      Living Environment:  Pt lives in single story home with , ramps to enter.  Prior to admission, patients level of function was mod ind with quad cane use for long distances, otherwise she is ind.  Equipment used at home: cane, quad.  DME owned (not currently used): none.  Upon discharge, patient will have assistance from .    Objective:     Communicated with nurse prior to session.  Patient found supine with peripheral IV  upon PT entry to room.    General Precautions: Standard, fall  Orthopedic Precautions:N/A   Braces:    Respiratory Status: Room air    Exams:  RLE ROM: WFL  RLE Strength: WFL  LLE ROM: WFL  LLE Strength: " "WFL    Functional Mobility:  Bed Mobility:     Supine to Sit: stand by assistance  Transfers:     Sit to Stand:  contact guard assistance with no AD  Gait: Pt ambulated 200 ft w no AD and CGA, pt had 2 instances of feeling unsteady and attempted to grab walls for stability.   Stairs:  Pt ascended/descended 4" curb step and ramp with Rolling Walker with no handrails with Minimal Assistance. Pt demonstrated slight unsteadiness when attempting to descend curb requiring min A for balance        Treatment & Education:  Pt edu on importance of frequent mobility and cane use as needed     Patient left up in chair with all lines intact, call button in reach, nurse notified, and  present.    GOALS:   Multidisciplinary Problems       Physical Therapy Goals          Problem: Physical Therapy    Goal Priority Disciplines Outcome Goal Variances Interventions   Physical Therapy Goal     PT, PT/OT Progressing     Description: Pt will improve functional independence by performing:    Bed mobility: SBA  Sit to stand: SBA with LRAD  Bed to chair t/f: SBA with Stand Step  with LRAD  Ambulation x 200'  with SBA with LRAD  1 Step (Curb): Min A  with LRAD  Ramp: CGA with LRAD                         History:     Past Medical History:   Diagnosis Date    Anxiety disorder, unspecified     Clotting disorder     Depression     Digestive disorder     High cholesterol     Hypertension     Protein S deficiency        Past Surgical History:   Procedure Laterality Date    COLONOSCOPY      HAIM FILTER PLACEMENT      HIP ASPIRATION AND DRAINAGE      KNEE ARTHROPLASTY      torn meniscus    KNEE ARTHROSCOPY W/ MENISCECTOMY Right 09/19/2023    Procedure: ARTHROSCOPY, KNEE, WITH MENISCECTOMY;  Surgeon: Josh Mendosa MD;  Location: [facility];  Service: Orthopedics;  Laterality: Right;    PULMONARY EMBOLISM SURGERY Bilateral     Haim filter    TONSILLECTOMY         Time Tracking:     PT Received On:    PT Start Time: 1134     PT Stop " Time: 1154  PT Total Time (min): 20 min     Billable Minutes: Evaluation 20 07/24/2024

## 2024-07-24 NOTE — PROGRESS NOTES
[facility]  [facility] Medicine Progress Note      Patient Name: Sandra Angel  MRN: [id number]  Admission Date: 7/23/2024   Length of Stay: 1  Attending Physician: Eddy Malhotra MD  Primary Care Provider: Loyd Guadalupe MD  Face-to-Face encounter date: 07/24/2024    Code Status: Full Code        Chief Complaint:   Fever (Pt c/o having fever for the past eight days, along with difficulty urinating and confusion.)        HPI:   Sandra Angel is a 68 y.o. female who  has a past medical history of Anxiety disorder, unspecified, Clotting disorder, Depression, Digestive disorder, High cholesterol, Hypertension, and Protein S deficiency. The patient presented to [facility] on 7/23/2024 with a primary complaint of recurrent fever for about 8 days. She was in [place] at a conference when she became lightheaded. She didn't pass out. She began having fevers, nausea and vomiting. The n/v lasted about 4 days. It was bilious and was about 2x /day. She had a poor appetite and had trouble holding down food when tried to eat. She also had decreased urination during the past week. She was confused, lethargic at times and had occasional hallucinations. She did urinate today. She has had a BM in 7 days. She fell twice in the past week. She didn't take her temp but reported fever, chills and sweats. An OTC covid test was negative. She denies sick contacts. She also had a sore throat. Her family brought her in due to her ongoing symptoms. She was able to give me a full history. CXR in the ER shows RLL pneumonia.     Overview/Hospital Course:  No notes on file       Interval Hx:   Feeling better, tolerating oral diet, ambulated with some residual weakness but improving.  No sob/cough/f/c    Review of Systems   All other systems reviewed and are negative.      Objective/physical exam:  General: In no acute distress, afebrile  Chest: Clear to auscultation bilaterally  Heart: RRR, +S1, S2, no  appreciable murmur  Abdomen: Soft, nontender, BS +  MSK: Warm, no lower extremity edema, no clubbing or cyanosis  Neurologic: Alert and oriented x4, Cranial nerve II-XII intact,     VITAL SIGNS: 24 HRS MIN & MAX LAST   Temp  Min: 97.5 °F (36.4 °C)  Max: 99 °F (37.2 °C) 97.5 °F (36.4 °C)   BP  Min: 106/60  Max: 121/68 112/67   Pulse  Min: 66  Max: 86  66   Resp  Min: 18  Max: 20 20   SpO2  Min: 91 %  Max: 95 % (!) 93 %       Recent Labs   Lab 07/23/24  0952 07/24/24  0440   WBC 6.45 4.17*   RBC 3.88* 3.38*   HGB 12.3 10.8*   HCT 35.5* 31.2*   MCV 91.5 92.3   MCH 31.7* 32.0*   MCHC 34.6 34.6   RDW 13.1 13.1   PLT 80* 145   MPV 12.3* 11.1*       Recent Labs   Lab 07/23/24  0952 07/24/24 0440   * 137   K 3.6 3.3*   CL 95* 103   CO2 25 26   BUN 19.2 12.7   CREATININE 0.95 0.75   CALCIUM 8.6 8.4   MG 1.80  --    ALBUMIN 3.1*  --    ALKPHOS 73  --    ALT 42  --    AST 62*  --    BILITOT 1.2  --         Microbiology Results (last 7 days)       Procedure Component Value Units Date/Time    Blood culture #1 **CANNOT BE ORDERED STAT** [[id number]] Collected: 07/23/24 1330    Order Status: Resulted Specimen: Blood from Antecubital, Left Updated: 07/23/24 1335    Blood culture #2 **CANNOT BE ORDERED STAT** [[id number]] Collected: 07/23/24 1330    Order Status: Resulted Specimen: Blood from Arm, Left Updated: 07/23/24 1335             Radiology:  X-Ray Abdomen Flat And Erect  Narrative: EXAMINATION:  XR ABDOMEN FLAT AND ERECT    CLINICAL HISTORY:  eval constipation, n/v;, .    FINDINGS:  The bowel gas pattern is nonspecific, and no free air or pneumatosis is seen. There is no evidence for organomegaly. No abnormal calcifications are seen.  There are some degenerative changes of the lumbosacral spine and hip joints..    Some mild to moderate amount of feces throughout the colon  Impression: No acute process is identified.    Electronically signed by: Brain Roldan  Date:    07/24/2024  Time:    08:35      Scheduled  Med:   albuterol-ipratropium  3 mL Nebulization Q6H WAKE    azithromycin  500 mg Intravenous Q24H    cefTRIAXone (Rocephin) IV (PEDS and ADULTS)  1 g Intravenous Q24H    docusate sodium  100 mg Oral BID    DULoxetine  30 mg Oral Nightly    enoxparin  1 mg/kg Subcutaneous Q12H (prophylaxis, 0900/2100)    nicotine  1 patch Transdermal Daily    polyethylene glycol  17 g Oral BID    warfarin  2.5 mg Oral Every Mon, Wed, Fri    [START ON 7/25/2024] warfarin  5 mg Oral Every Tues, Thurs, Sat, Sun        Continuous Infusions:   0/9% NACL & POTASSIUM CHLORIDE 20 MEQ/L   Intravenous Continuous 125 mL/hr at 07/24/24 0229 New Bag at 07/24/24 0229        PRN Meds:    Current Facility-Administered Medications:     bisacodyL, 10 mg, Rectal, Daily PRN    LORazepam, 2 mg, Oral, Nightly PRN    melatonin, 6 mg, Oral, Nightly PRN    ondansetron, 4 mg, Intravenous, Q8H PRN    sodium chloride 0.9%, 10 mL, Intravenous, PRN     Nutrition Status:      Assessment/Plan:  Right lower lobe pneumonia  Fever due to above  Acute encephalopathy  Constipation  HTN  HLP  Elevated lfts  Hx of Protein S deficiency  Hx of VTE, s/p IVC filter  Tobacco abuse     Plan    Mildly elevated LFTs, hold statin, labs in am  cont coumadin, follow INR  dc lovenox    Cont abx  Follow blood cultures  PTOT  The patient last smoked 8 days ago. I counseled her on the need to quit.  Dc ivf  Kub no acute issues    Dvt proph: coumadin            All diagnosis and differential diagnosis have been reviewed; assessment and plan has been documented; I have personally reviewed the labs and test results that are presently available; I have reviewed the patients medication list; I have reviewed the consulting providers response and recommendations. I have reviewed or attempted to review medical records based upon their availability      _____________________________________________________________________            Eddy Malhotra MD   07/24/2024

## 2024-07-24 NOTE — PLAN OF CARE
07/24/24 0911   Medicare Message   Important Message from Medicare regarding Discharge Appeal Rights Given to patient/caregiver;Explained to patient/caregiver

## 2024-07-24 NOTE — PLAN OF CARE
07/24/24 0911   Discharge Assessment   Assessment Type Discharge Planning Assessment   Confirmed/corrected address, phone number and insurance Yes   Confirmed Demographics Correct on Facesheet   Source of Information patient;family   When was your last doctors appointment? 01/15/24   Communicated OZ with patient/caregiver Date not available/Unable to determine   Reason For Admission PNA   People in Home spouse   Do you expect to return to your current living situation? Yes   Do you have help at home or someone to help you manage your care at home? Yes   Who are your caregiver(s) and their phone number(s)? Carter ()   Current cognitive status: Alert/Oriented   Walking or Climbing Stairs Difficulty no   Dressing/Bathing Difficulty no   Home Layout Able to live on 1st floor   Equipment Currently Used at Home cane, quad   Readmission within 30 days? No   Patient currently being followed by outpatient case management? No   Do you currently have service(s) that help you manage your care at home? No   Do you take prescription medications? Yes   Do you have any problems affording any of your prescribed medications? No   Is the patient taking medications as prescribed? yes   Who is going to help you get home at discharge? Carter   How do you get to doctors appointments? car, drives self   Are you on dialysis? No   Do you take coumadin? Yes   Who monitors your labs? Dr Guadalupe; Pt goes to the [facility] q other Thursday for monitoring.   Discharge Plan A Home   Discharge Plan B Home Health   DME Needed Upon Discharge  none   Discharge Plan discussed with: Patient;Spouse/sig other   Transition of Care Barriers None   Financial Resource Strain   How hard is it for you to pay for the very basics like food, housing, medical care, and heating? Not hard   Housing Stability   In the last 12 months, was there a time when you were not able to pay the mortgage or rent on time? N   At any time in the past 12  months, were you homeless or living in a shelter (including now)? N   Transportation Needs   Has the lack of transportation kept you from medical appointments, meetings, work or from getting things needed for daily living? No   Food Insecurity   Within the past 12 months, you worried that your food would run out before you got the money to buy more. Never true   Within the past 12 months, the food you bought just didn't last and you didn't have money to get more. Never true   Stress   Do you feel stress - tense, restless, nervous, or anxious, or unable to sleep at night because your mind is troubled all the time - these days? Only a littl   Social Isolation   How often do you feel lonely or isolated from those around you?  Sometimes   Alcohol Use   Q1: How often do you have a drink containing alcohol? Never   Q2: How many drinks containing alcohol do you have on a typical day when you are drinking? None   Utilities   In the past 12 months has the electric, gas, oil, or water company threatened to shut off services in your home? No   Health Literacy   How often do you need to have someone help you when you read instructions, pamphlets, or other written material from your doctor or pharmacy? Never   OTHER   Name(s) of People in Home Carter     Pharmacy: [facility]; [facility]  Pt states she is indep in adl's; occas uses a small quad cane.

## 2024-07-25 VITALS
SYSTOLIC BLOOD PRESSURE: 119 MMHG | HEIGHT: 67 IN | WEIGHT: 220 LBS | BODY MASS INDEX: 34.53 KG/M2 | RESPIRATION RATE: 16 BRPM | TEMPERATURE: 98 F | HEART RATE: 68 BPM | DIASTOLIC BLOOD PRESSURE: 63 MMHG | OXYGEN SATURATION: 91 %

## 2024-07-25 LAB
ALBUMIN SERPL-MCNC: 2.4 G/DL (ref 3.4–4.8)
ALP SERPL-CCNC: 56 UNIT/L (ref 40–150)
ALT SERPL-CCNC: 54 UNIT/L (ref 0–55)
ANION GAP SERPL CALC-SCNC: 8 MEQ/L
AST SERPL-CCNC: 61 UNIT/L (ref 5–34)
BASOPHILS # BLD AUTO: 0.02 X10(3)/MCL
BASOPHILS NFR BLD AUTO: 0.5 %
BILIRUB DIRECT SERPL-MCNC: 0.3 MG/DL (ref 0–?)
BILIRUB SERPL-MCNC: 0.6 MG/DL
BILIRUBIN DIRECT+TOT PNL SERPL-MCNC: 0.3 MG/DL (ref 0–0.8)
BUN SERPL-MCNC: 9.2 MG/DL (ref 9.8–20.1)
CALCIUM SERPL-MCNC: 8.6 MG/DL (ref 8.4–10.2)
CHLORIDE SERPL-SCNC: 105 MMOL/L (ref 98–107)
CO2 SERPL-SCNC: 28 MMOL/L (ref 23–31)
CREAT SERPL-MCNC: 0.66 MG/DL (ref 0.55–1.02)
CREAT/UREA NIT SERPL: 14
EOSINOPHIL # BLD AUTO: 0.07 X10(3)/MCL (ref 0–0.9)
EOSINOPHIL NFR BLD AUTO: 1.7 %
ERYTHROCYTE [DISTWIDTH] IN BLOOD BY AUTOMATED COUNT: 13.2 % (ref 11.5–17)
GFR SERPLBLD CREATININE-BSD FMLA CKD-EPI: >60 ML/MIN/1.73/M2
GLUCOSE SERPL-MCNC: 113 MG/DL (ref 82–115)
HCT VFR BLD AUTO: 31.3 % (ref 37–47)
HGB BLD-MCNC: 10.6 G/DL (ref 12–16)
IMM GRANULOCYTES # BLD AUTO: 0.02 X10(3)/MCL (ref 0–0.04)
IMM GRANULOCYTES NFR BLD AUTO: 0.5 %
INR PPP: 2.2 (ref 2–3)
LYMPHOCYTES # BLD AUTO: 1.13 X10(3)/MCL (ref 0.6–4.6)
LYMPHOCYTES NFR BLD AUTO: 27.4 %
MAGNESIUM SERPL-MCNC: 2 MG/DL (ref 1.6–2.6)
MCH RBC QN AUTO: 31.1 PG (ref 27–31)
MCHC RBC AUTO-ENTMCNC: 33.9 G/DL (ref 33–36)
MCV RBC AUTO: 91.8 FL (ref 80–94)
MONOCYTES # BLD AUTO: 0.47 X10(3)/MCL (ref 0.1–1.3)
MONOCYTES NFR BLD AUTO: 11.4 %
NEUTROPHILS # BLD AUTO: 2.42 X10(3)/MCL (ref 2.1–9.2)
NEUTROPHILS NFR BLD AUTO: 58.5 %
NRBC BLD AUTO-RTO: 0 %
PLATELET # BLD AUTO: 174 X10(3)/MCL (ref 130–400)
PMV BLD AUTO: 10.8 FL (ref 7.4–10.4)
POTASSIUM SERPL-SCNC: 3.7 MMOL/L (ref 3.5–5.1)
PROT SERPL-MCNC: 5.4 GM/DL (ref 5.8–7.6)
PROTHROMBIN TIME: 24.4 SECONDS (ref 11.7–14.5)
RBC # BLD AUTO: 3.41 X10(6)/MCL (ref 4.2–5.4)
SODIUM SERPL-SCNC: 141 MMOL/L (ref 136–145)
WBC # BLD AUTO: 4.13 X10(3)/MCL (ref 4.5–11.5)

## 2024-07-25 PROCEDURE — 25000003 PHARM REV CODE 250: Performed by: INTERNAL MEDICINE

## 2024-07-25 PROCEDURE — 99900031 HC PATIENT EDUCATION (STAT)

## 2024-07-25 PROCEDURE — 85025 COMPLETE CBC W/AUTO DIFF WBC: CPT | Performed by: INTERNAL MEDICINE

## 2024-07-25 PROCEDURE — 97116 GAIT TRAINING THERAPY: CPT | Mod: CQ

## 2024-07-25 PROCEDURE — 80076 HEPATIC FUNCTION PANEL: CPT | Performed by: INTERNAL MEDICINE

## 2024-07-25 PROCEDURE — 94640 AIRWAY INHALATION TREATMENT: CPT

## 2024-07-25 PROCEDURE — 85610 PROTHROMBIN TIME: CPT | Performed by: INTERNAL MEDICINE

## 2024-07-25 PROCEDURE — 83735 ASSAY OF MAGNESIUM: CPT | Performed by: INTERNAL MEDICINE

## 2024-07-25 PROCEDURE — 94761 N-INVAS EAR/PLS OXIMETRY MLT: CPT

## 2024-07-25 PROCEDURE — 94799 UNLISTED PULMONARY SVC/PX: CPT

## 2024-07-25 PROCEDURE — 63600175 PHARM REV CODE 636 W HCPCS: Performed by: INTERNAL MEDICINE

## 2024-07-25 PROCEDURE — 36415 COLL VENOUS BLD VENIPUNCTURE: CPT | Performed by: INTERNAL MEDICINE

## 2024-07-25 PROCEDURE — 80048 BASIC METABOLIC PNL TOTAL CA: CPT | Performed by: INTERNAL MEDICINE

## 2024-07-25 PROCEDURE — 25000242 PHARM REV CODE 250 ALT 637 W/ HCPCS: Performed by: INTERNAL MEDICINE

## 2024-07-25 RX ORDER — POLYETHYLENE GLYCOL 3350 17 G/17G
17 POWDER, FOR SOLUTION ORAL 2 TIMES DAILY
Qty: 60 PACKET | Refills: 0 | Status: SHIPPED | OUTPATIENT
Start: 2024-07-25

## 2024-07-25 RX ORDER — AMOXICILLIN AND CLAVULANATE POTASSIUM 875; 125 MG/1; MG/1
1 TABLET, FILM COATED ORAL 2 TIMES DAILY
Qty: 8 TABLET | Refills: 0 | Status: SHIPPED | OUTPATIENT
Start: 2024-07-25

## 2024-07-25 RX ORDER — GUAIFENESIN 600 MG/1
600 TABLET, EXTENDED RELEASE ORAL 2 TIMES DAILY
Qty: 10 TABLET | Refills: 0 | Status: SHIPPED | OUTPATIENT
Start: 2024-07-25 | End: 2024-07-30

## 2024-07-25 RX ORDER — ALBUTEROL SULFATE 90 UG/1
2 AEROSOL, METERED RESPIRATORY (INHALATION) EVERY 6 HOURS PRN
Qty: 20.1 G | Refills: 0 | Status: SHIPPED | OUTPATIENT
Start: 2024-07-25

## 2024-07-25 RX ORDER — WARFARIN SODIUM 5 MG/1
5 TABLET ORAL
Start: 2024-07-25 | End: 2025-07-25

## 2024-07-25 RX ORDER — GUAIFENESIN 600 MG/1
600 TABLET, EXTENDED RELEASE ORAL 2 TIMES DAILY
Status: DISCONTINUED | OUTPATIENT
Start: 2024-07-25 | End: 2024-07-25 | Stop reason: HOSPADM

## 2024-07-25 RX ORDER — WARFARIN 2.5 MG/1
2.5 TABLET ORAL
Start: 2024-07-26 | End: 2025-07-26

## 2024-07-25 RX ORDER — CETIRIZINE HYDROCHLORIDE 10 MG/1
10 TABLET ORAL DAILY
Qty: 30 TABLET | Refills: 0 | Status: SHIPPED | OUTPATIENT
Start: 2024-07-25 | End: 2025-07-25

## 2024-07-25 RX ORDER — IBUPROFEN 200 MG
1 TABLET ORAL DAILY
Qty: 14 PATCH | Refills: 0 | Status: SHIPPED | OUTPATIENT
Start: 2024-07-26

## 2024-07-25 RX ORDER — IBUPROFEN 200 MG
1 TABLET ORAL DAILY
Start: 2024-07-25

## 2024-07-25 RX ORDER — AMOXICILLIN 250 MG
2 CAPSULE ORAL DAILY PRN
Qty: 60 TABLET | Refills: 0 | Status: SHIPPED | OUTPATIENT
Start: 2024-07-25

## 2024-07-25 RX ADMIN — GUAIFENESIN 600 MG: 600 TABLET, EXTENDED RELEASE ORAL at 12:07

## 2024-07-25 RX ADMIN — IPRATROPIUM BROMIDE AND ALBUTEROL SULFATE 3 ML: 2.5; .5 SOLUTION RESPIRATORY (INHALATION) at 07:07

## 2024-07-25 RX ADMIN — CEFTRIAXONE SODIUM 1 G: 1 INJECTION, POWDER, FOR SOLUTION INTRAMUSCULAR; INTRAVENOUS at 09:07

## 2024-07-25 NOTE — PROGRESS NOTES
[facility]  [facility] Medicine Progress Note      Patient Name: Sandra Angel  MRN: [id number]  Admission Date: 7/23/2024   Length of Stay: 2  Attending Physician: Eddy Malhotra MD  Primary Care Provider: Loyd Guadalupe MD  Face-to-Face encounter date: 07/25/2024    Code Status: Full Code        Chief Complaint:   Fever (Pt c/o having fever for the past eight days, along with difficulty urinating and confusion.)        HPI:   Sandra Angel is a 68 y.o. female who  has a past medical history of Anxiety disorder, unspecified, Clotting disorder, Depression, Digestive disorder, High cholesterol, Hypertension, and Protein S deficiency. The patient presented to [facility] on 7/23/2024 with a primary complaint of recurrent fever for about 8 days. She was in [place] at a conference when she became lightheaded. She didn't pass out. She began having fevers, nausea and vomiting. The n/v lasted about 4 days. It was bilious and was about 2x /day. She had a poor appetite and had trouble holding down food when tried to eat. She also had decreased urination during the past week. She was confused, lethargic at times and had occasional hallucinations. She did urinate today. She has had a BM in 7 days. She fell twice in the past week. She didn't take her temp but reported fever, chills and sweats. An OTC covid test was negative. She denies sick contacts. She also had a sore throat. Her family brought her in due to her ongoing symptoms. She was able to give me a full history. CXR in the ER shows RLL pneumonia.     Overview/Hospital Course:  No notes on file       Interval Hx:   +cough all night-dry, poor sleep.  No f/c/sob.  Appetite/strength improving.    Review of Systems   All other systems reviewed and are negative.      Objective/physical exam:  General: In no acute distress, afebrile  Chest: Clear to auscultation bilaterally  Heart: RRR, +S1, S2, no appreciable murmur  Abdomen: Soft,  nontender, BS +  MSK: Warm, no lower extremity edema, no clubbing or cyanosis  Neurologic: Alert and oriented x4, Cranial nerve II-XII intact,     VITAL SIGNS: 24 HRS MIN & MAX LAST   Temp  Min: 97.5 °F (36.4 °C)  Max: 97.7 °F (36.5 °C) 97.5 °F (36.4 °C)   BP  Min: 108/61  Max: 119/63 119/63   Pulse  Min: 66  Max: 78  68   Resp  Min: 16  Max: 20 16   SpO2  Min: 91 %  Max: 96 % (!) 91 %       Recent Labs   Lab 07/23/24  0952 07/24/24  0440 07/25/24  0508   WBC 6.45 4.17* 4.13*   RBC 3.88* 3.38* 3.41*   HGB 12.3 10.8* 10.6*   HCT 35.5* 31.2* 31.3*   MCV 91.5 92.3 91.8   MCH 31.7* 32.0* 31.1*   MCHC 34.6 34.6 33.9   RDW 13.1 13.1 13.2   PLT 80* 145 174   MPV 12.3* 11.1* 10.8*       Recent Labs   Lab 07/23/24  0952 07/24/24  0440 07/25/24  0508   * 137 141   K 3.6 3.3* 3.7   CL 95* 103 105   CO2 25 26 28   BUN 19.2 12.7 9.2*   CREATININE 0.95 0.75 0.66   CALCIUM 8.6 8.4 8.6   MG 1.80  --  2.00   ALBUMIN 3.1*  --   --    ALKPHOS 73  --   --    ALT 42  --   --    AST 62*  --   --    BILITOT 1.2  --   --         Microbiology Results (last 7 days)       Procedure Component Value Units Date/Time    Blood culture #1 **CANNOT BE ORDERED STAT** [[id number]]  (Normal) Collected: 07/23/24 1330    Order Status: Completed Specimen: Blood from Antecubital, Left Updated: 07/24/24 1902     Blood Culture No Growth At 24 Hours    Blood culture #2 **CANNOT BE ORDERED STAT** [[id number]]  (Normal) Collected: 07/23/24 1330    Order Status: Completed Specimen: Blood from Arm, Left Updated: 07/24/24 6642     Blood Culture No Growth At 24 Hours             Radiology:  X-Ray Abdomen Flat And Erect  Narrative: EXAMINATION:  XR ABDOMEN FLAT AND ERECT    CLINICAL HISTORY:  eval constipation, n/v;, .    FINDINGS:  The bowel gas pattern is nonspecific, and no free air or pneumatosis is seen. There is no evidence for organomegaly. No abnormal calcifications are seen.  There are some degenerative changes of the lumbosacral spine and hip  joints..    Some mild to moderate amount of feces throughout the colon  Impression: No acute process is identified.    Electronically signed by: Brain Roldan  Date:    07/24/2024  Time:    08:35      Scheduled Med:   albuterol-ipratropium  3 mL Nebulization Q6H WAKE    azithromycin  500 mg Intravenous Q24H    cefTRIAXone (Rocephin) IV (PEDS and ADULTS)  1 g Intravenous Q24H    docusate sodium  100 mg Oral BID    DULoxetine  30 mg Oral Nightly    nicotine  1 patch Transdermal Daily    polyethylene glycol  17 g Oral BID    warfarin  2.5 mg Oral Every Mon, Wed, Fri    warfarin  5 mg Oral Every Tues, Thurs, Sat, Sun        Continuous Infusions:         PRN Meds:    Current Facility-Administered Medications:     bisacodyL, 10 mg, Rectal, Daily PRN    LORazepam, 2 mg, Oral, Nightly PRN    melatonin, 6 mg, Oral, Nightly PRN    ondansetron, 4 mg, Intravenous, Q8H PRN    sodium chloride 0.9%, 10 mL, Intravenous, PRN     Nutrition Status:      Assessment/Plan:  Right lower lobe pneumonia  Fever due to above  Acute encephalopathy resolved  Constipation  HTN  HLP  Elevated lfts  Hx of Protein S deficiency  Hx of VTE, s/p IVC filter  Tobacco abuse     Plan    Mildly elevated LFTs, hold statin, labs reviewed  cont coumadin, follow INR  Cont abx/add muciness  Follow blood cultures no growth  PTOT  counseled her on the need to quit smoking.  Will see how pt does today, possible discharge this afternoon    Dvt proph: coumadin            All diagnosis and differential diagnosis have been reviewed; assessment and plan has been documented; I have personally reviewed the labs and test results that are presently available; I have reviewed the patients medication list; I have reviewed the consulting providers response and recommendations. I have reviewed or attempted to review medical records based upon their availability      _____________________________________________________________________            Eddy Malhotra MD    07/25/2024

## 2024-07-25 NOTE — PLAN OF CARE
Problem: Physical Therapy  Goal: Physical Therapy Goal  Description: Pt will improve functional independence by performing:    Bed mobility: SBA  Sit to stand: SBA with LRAD  Bed to chair t/f: SBA with Stand Step  with LRAD  Ambulation x 200'  with SBA with LRAD-met  1 Step (Curb): Min A  with LRAD  Ramp: CGA with LRAD    Outcome: Progressing

## 2024-07-25 NOTE — PLAN OF CARE
Problem: Adult Inpatient Plan of Care  Goal: Plan of Care Review  Outcome: Progressing  Flowsheets (Taken 7/25/2024 0016)  Plan of Care Reviewed With:   patient   spouse  Goal: Patient-Specific Goal (Individualized)  Outcome: Progressing  Goal: Absence of Hospital-Acquired Illness or Injury  Outcome: Progressing  Intervention: Identify and Manage Fall Risk  Flowsheets (Taken 7/25/2024 0016)  Safety Promotion/Fall Prevention:   assistive device/personal item within reach   Fall Risk reviewed with patient/family   Fall Risk signage in place   lighting adjusted   medications reviewed   nonskid shoes/socks when out of bed   side rails raised x 2  Intervention: Prevent Skin Injury  Flowsheets (Taken 7/25/2024 0016)  Body Position:   supine   position changed independently  Skin Protection: protective footwear used  Device Skin Pressure Protection: tubing/devices free from skin contact  Intervention: Prevent and Manage VTE (Venous Thromboembolism) Risk  Flowsheets (Taken 7/25/2024 0016)  VTE Prevention/Management:   bleeding precations maintained   bleeding risk assessed   ambulation promoted   remove, assess skin, and reapply sequential compression device  Intervention: Prevent Infection  Flowsheets (Taken 7/25/2024 0016)  Infection Prevention:   environmental surveillance performed   equipment surfaces disinfected   hand hygiene promoted   personal protective equipment utilized   rest/sleep promoted   single patient room provided  Goal: Optimal Comfort and Wellbeing  Outcome: Progressing  Intervention: Monitor Pain and Promote Comfort  Flowsheets (Taken 7/25/2024 0016)  Pain Management Interventions:   care clustered   quiet environment facilitated  Intervention: Provide Person-Centered Care  Flowsheets (Taken 7/25/2024 0016)  Trust Relationship/Rapport:   care explained   choices provided   emotional support provided   empathic listening provided   questions answered   questions encouraged   reassurance provided    thoughts/feelings acknowledged  Goal: Readiness for Transition of Care  Outcome: Progressing     Problem: Pneumonia  Goal: Fluid Balance  Outcome: Progressing  Goal: Resolution of Infection Signs and Symptoms  Outcome: Progressing  Intervention: Prevent Infection Progression  Flowsheets (Taken 7/25/2024 0016)  Isolation Precautions: precautions maintained  Goal: Effective Oxygenation and Ventilation  Outcome: Progressing  Intervention: Promote Airway Secretion Clearance  Flowsheets (Taken 7/25/2024 0016)  Cough And Deep Breathing: done independently per patient

## 2024-07-25 NOTE — PT/OT/SLP PROGRESS
Physical Therapy Treatment    Patient Name:  Sandra Angel   MRN:  [id number]    Recommendations:     Discharge Recommendations: Low Intensity Therapy  Discharge Equipment Recommendations: cane, straight, other (see comments) (TBD)  Barriers to discharge: None    Assessment:     Sandra Angel is a 68 y.o. female admitted with a medical diagnosis of Community acquired pneumonia of right lower lobe of lung.  She presents with the following impairments/functional limitations: weakness, impaired endurance, gait instability, impaired balance .    Rehab Prognosis: Good; patient would benefit from acute skilled PT services to address these deficits and reach maximum level of function.    Recent Surgery: * No surgery found *      Plan:     During this hospitalization, patient to be seen daily (QD-BID) to address the identified rehab impairments via gait training, therapeutic activities, therapeutic exercises and progress toward the following goals:    Plan of Care Expires:  07/30/24    Subjective     Chief Complaint: feeling tired due to not sleeping well last night  Patient/Family Comments/goals: go home  Pain/Comfort:         Objective:     Communicated with rn prior to session.  Patient found supine with   upon PT entry to room.     General Precautions: Standard, fall  Orthopedic Precautions: N/A  Braces:    Respiratory Status: Room air     Functional Mobility:  Bed Mobility:     Supine to Sit: supervision  Transfers:     Sit to Stand:  supervision with no AD  Bed to Chair: supervision with  no AD  using  Step Transfer  Gait: pt amb 200ft w/o ad and sba for safety. Pt had slight unsteadiness with no lob noted.       Patient left up in chair with all lines intact and call button in reach..    GOALS:   Multidisciplinary Problems       Physical Therapy Goals          Problem: Physical Therapy    Goal Priority Disciplines Outcome Goal Variances Interventions   Physical Therapy Goal     PT, PT/OT Progressing     Description: Pt will  improve functional independence by performing:    Bed mobility: SBA  Sit to stand: SBA with LRAD  Bed to chair t/f: SBA with Stand Step  with LRAD  Ambulation x 200'  with SBA with LRAD-met  1 Step (Curb): Min A  with LRAD  Ramp: CGA with LRAD                         Time Tracking:     PT Received On:    PT Start Time: 1042     PT Stop Time: 1052  PT Total Time (min): 10 min     Billable Minutes: Gait Training 10    Treatment Type: Treatment  PT/PTA: PTA     Number of PTA visits since last PT visit: 1 07/25/2024

## 2024-07-25 NOTE — NURSING
Nurses Note -- 4 Eyes      7/23/2024   10:11 PM      Skin assessed during: Q Shift Change      [x] No Altered Skin Integrity Present    [x]Prevention Measures Documented      [] Yes- Altered Skin Integrity Present or Discovered   [] LDA Added if Not in Epic (Describe Wound)   [] New Altered Skin Integrity was Present on Admit and Documented in LDA   [] Wound Image Taken    Wound Care Consulted? No    Attending Nurse:  Reinaldo Bull RN/Staff Member:   Meaghan          
Nurses Note -- 4 Eyes      7/25/2024   2:26 AM      Skin assessed during: Q Shift Change      [x] No Altered Skin Integrity Present    [x]Prevention Measures Documented      [] Yes- Altered Skin Integrity Present or Discovered   [] LDA Added if Not in Epic (Describe Wound)   [] New Altered Skin Integrity was Present on Admit and Documented in LDA   [] Wound Image Taken    Wound Care Consulted? No    Attending Nurse:  Daniel Rendon RN    Second RN/Staff Member:  Yessy Castellano RN         
Pt left in wc with staff member. Pt stable, no distress.  at bedside for all dc instructions. Pt stated she has a pcp follow up in a few days with Dr. Guadalupe. Pt and  advised to  all medication that were sent to the pharmacy.   
Airway patent, TM normal bilaterally, normal appearing mouth, nose, throat, neck supple with full range of motion, no cervical adenopathy.

## 2024-07-25 NOTE — PLAN OF CARE
Problem: Adult Inpatient Plan of Care  Goal: Plan of Care Review  Outcome: Met  Goal: Patient-Specific Goal (Individualized)  Outcome: Met  Goal: Absence of Hospital-Acquired Illness or Injury  Outcome: Met  Goal: Optimal Comfort and Wellbeing  Outcome: Met  Goal: Readiness for Transition of Care  Outcome: Met     Problem: Pneumonia  Goal: Fluid Balance  Outcome: Met  Goal: Resolution of Infection Signs and Symptoms  Outcome: Met  Goal: Effective Oxygenation and Ventilation  Outcome: Met     Problem: Physical Therapy  Goal: Physical Therapy Goal  Description: Pt will improve functional independence by performing:    Bed mobility: SBA  Sit to stand: SBA with LRAD  Bed to chair t/f: SBA with Stand Step  with LRAD  Ambulation x 200'  with SBA with LRAD-met  1 Step (Curb): Min A  with LRAD  Ramp: CGA with LRAD    Outcome: Met

## 2024-07-25 NOTE — DISCHARGE SUMMARY
Hospital Medicine Discharge Summary    Admit Date: 7/23/2024  Discharge Date and Time: 7/25/20241:42 PM  Admitting Physician: Vitor Sosa MD   Discharge Attending Physician: Eddy Malhotra.  Primary Care Physician: Loyd Guadalupe MD  Consults: Dr. Hoffman    Discharge Diagnoses:  Active Hospital Problems    Diagnosis  POA    *Community acquired pneumonia of right lower lobe of lung [J18.9]  Yes    Tobacco dependency [F17.200]  Yes    Generalized weakness [R53.1]  Yes      Resolved Hospital Problems   No resolved problems to display.        Hospital Course:   Sandra Angel is a 68 y.o. female who has a past medical history of Anxiety disorder, unspecified, Clotting disorder, Depression, Digestive disorder, High cholesterol, Hypertension, and Protein S deficiency. The patient presented to [facility] on 7/23/2024 with a primary complaint of recurrent fever for about 8 days. She was in [place] at a conference when she became lightheaded. She didn't pass out. She began having fevers, nausea and vomiting. The n/v lasted about 4 days. It was bilious and was about 2x /day. She had a poor appetite and had trouble holding down food when tried to eat. She also had decreased urination during the past week. She was confused, lethargic at times and had occasional hallucinations. She did urinate today. She has had a BM in 7 days. She fell twice in the past week. She didn't take her temp but reported fever, chills and sweats. An OTC covid test was negative. She denies sick contacts. She also had a sore throat. Her family brought her in due to her ongoing symptoms. She was able to give me a full history. CXR in the ER shows RLL pneumonia.   Pt strength and appetite has improved, she has a mild nonproductive cough/postnasal drip, no sob.  I will send home with zyrtec, mucinex and she has flonase at home, as well as augmentin to complete 7 d course of antibiotics.  I have also ordered her a proventil inh and nicotine patch and  "counselled on smoking cessation.  I held her statin for mildly elevated lft and can f/u with pcp next week as scheduled for further recs.    /63   Pulse 68   Temp 97.5 °F (36.4 °C) (Oral)   Resp 16   Ht 5' 7" (1.702 m)   Wt 99.8 kg (220 lb)   SpO2 (!) 91%   BMI 34.46 kg/m²    Physical Exam   General: In no acute distress, afebrile  Chest: Clear to auscultation bilaterally  Heart: RRR, +S1, S2, no appreciable murmur  Abdomen: Soft, nontender, BS +  MSK: Warm, no lower extremity edema, no clubbing or cyanosis  Neurologic: Alert and oriented x4, Cranial nerve II-XII intact,   Procedures Performed: No admission procedures for hospital encounter.     Significant Diagnostic Studies: See Full reports for all details  Admission on 07/23/2024   Component Date Value Ref Range Status    Sodium 07/23/2024 130 (L)  136 - 145 mmol/L Final    Potassium 07/23/2024 3.6  3.5 - 5.1 mmol/L Final    Chloride 07/23/2024 95 (L)  98 - 107 mmol/L Final    CO2 07/23/2024 25  23 - 31 mmol/L Final    Glucose 07/23/2024 107  82 - 115 mg/dL Final    Blood Urea Nitrogen 07/23/2024 19.2  9.8 - 20.1 mg/dL Final    Creatinine 07/23/2024 0.95  0.55 - 1.02 mg/dL Final    Calcium 07/23/2024 8.6  8.4 - 10.2 mg/dL Final    Protein Total 07/23/2024 6.2  5.8 - 7.6 gm/dL Final    Albumin 07/23/2024 3.1 (L)  3.4 - 4.8 g/dL Final    Globulin 07/23/2024 3.1  2.4 - 3.5 gm/dL Final    Albumin/Globulin Ratio 07/23/2024 1.0 (L)  1.1 - 2.0 ratio Final    Bilirubin Total 07/23/2024 1.2  <=1.5 mg/dL Final    ALP 07/23/2024 73  40 - 150 unit/L Final    ALT 07/23/2024 42  0 - 55 unit/L Final    AST 07/23/2024 62 (H)  5 - 34 unit/L Final    eGFR 07/23/2024 >60  mL/min/1.73/m2 Final    Anion Gap 07/23/2024 10.0  mEq/L Final    BUN/Creatinine Ratio 07/23/2024 20   Final    Influenza A PCR 07/23/2024 Not Detected  Not Detected Final    Influenza B PCR 07/23/2024 Not Detected  Not Detected Final    SARS-CoV-2 PCR 07/23/2024 Not Detected  Not Detected, Negative " Final    Lactic Acid Level 07/23/2024 0.9  0.5 - 2.2 mmol/L Final    Magnesium Level 07/23/2024 1.80  1.60 - 2.60 mg/dL Final    Troponin-I 07/23/2024 0.016  0.000 - 0.045 ng/mL Final    TSH 07/23/2024 1.551  0.350 - 4.940 uIU/mL Final    Color, UA 07/23/2024 Yellow  Yellow, Light-Yellow, Dark Yellow, Kesha, Straw Final    Appearance, UA 07/23/2024 Clear  Clear Final    Specific Gravity, UA 07/23/2024 1.005  1.005 - 1.030 Final    pH, UA 07/23/2024 6.5  5.0 - 8.5 Final    Protein, UA 07/23/2024 Trace (A)  Negative Final    Glucose, UA 07/23/2024 Negative  Negative, Normal Final    Ketones, UA 07/23/2024 Negative  Negative Final    Blood, UA 07/23/2024 Trace (A)  Negative Final    Bilirubin, UA 07/23/2024 Negative  Negative Final    Urobilinogen, UA 07/23/2024 0.2  0.2, 1.0, Normal Final    Nitrites, UA 07/23/2024 Negative  Negative Final    Leukocyte Esterase, UA 07/23/2024 Trace (A)  Negative Final    WBC 07/23/2024 6.45  4.50 - 11.50 x10(3)/mcL Final    RBC 07/23/2024 3.88 (L)  4.20 - 5.40 x10(6)/mcL Final    Hgb 07/23/2024 12.3  12.0 - 16.0 g/dL Final    Hct 07/23/2024 35.5 (L)  37.0 - 47.0 % Final    MCV 07/23/2024 91.5  80.0 - 94.0 fL Final    MCH 07/23/2024 31.7 (H)  27.0 - 31.0 pg Final    MCHC 07/23/2024 34.6  33.0 - 36.0 g/dL Final    RDW 07/23/2024 13.1  11.5 - 17.0 % Final    Platelet 07/23/2024 80 (L)  130 - 400 x10(3)/mcL Final    MPV 07/23/2024 12.3 (H)  7.4 - 10.4 fL Final    Neut % 07/23/2024 74.8  % Final    Lymph % 07/23/2024 14.9  % Final    Mono % 07/23/2024 9.6  % Final    Eos % 07/23/2024 0.0  % Final    Basophil % 07/23/2024 0.2  % Final    Lymph # 07/23/2024 0.96  0.6 - 4.6 x10(3)/mcL Final    Neut # 07/23/2024 4.83  2.1 - 9.2 x10(3)/mcL Final    Mono # 07/23/2024 0.62  0.1 - 1.3 x10(3)/mcL Final    Eos # 07/23/2024 0.00  0 - 0.9 x10(3)/mcL Final    Baso # 07/23/2024 0.01  <=0.2 x10(3)/mcL Final    IG# 07/23/2024 0.03  0 - 0.04 x10(3)/mcL Final    IG% 07/23/2024 0.5  % Final    NRBC%  07/23/2024 0.0  % Final    Bacteria, UA 07/23/2024 Few (A)  None Seen, Rare, Occasional /HPF Final    RBC, UA 07/23/2024 0-5  None Seen, 0-2, 3-5, 0-5 /HPF Final    WBC, UA 07/23/2024 3-5  None Seen, 0-2, 3-5, 0-5 /HPF Final    Squamous Epithelial Cells, UA 07/23/2024 Few (A)  None Seen, Rare, Occasional, Occ /HPF Final    Blood Culture 07/23/2024 No Growth At 24 Hours   Preliminary    Blood Culture 07/23/2024 No Growth At 24 Hours   Preliminary    PT 07/23/2024 24.0 (H)  11.7 - 14.5 seconds Final    INR 07/23/2024 2.1  2.0 - 3.0 Final    Sodium 07/24/2024 137  136 - 145 mmol/L Final    Potassium 07/24/2024 3.3 (L)  3.5 - 5.1 mmol/L Final    Chloride 07/24/2024 103  98 - 107 mmol/L Final    CO2 07/24/2024 26  23 - 31 mmol/L Final    Glucose 07/24/2024 104  82 - 115 mg/dL Final    Blood Urea Nitrogen 07/24/2024 12.7  9.8 - 20.1 mg/dL Final    Creatinine 07/24/2024 0.75  0.55 - 1.02 mg/dL Final    BUN/Creatinine Ratio 07/24/2024 17   Final    Calcium 07/24/2024 8.4  8.4 - 10.2 mg/dL Final    Anion Gap 07/24/2024 8.0  mEq/L Final    eGFR 07/24/2024 >60  mL/min/1.73/m2 Final    PT 07/24/2024 23.2 (H)  11.7 - 14.5 seconds Final    INR 07/24/2024 2.0  2.0 - 3.0 Final    WBC 07/24/2024 4.17 (L)  4.50 - 11.50 x10(3)/mcL Final    RBC 07/24/2024 3.38 (L)  4.20 - 5.40 x10(6)/mcL Final    Hgb 07/24/2024 10.8 (L)  12.0 - 16.0 g/dL Final    Hct 07/24/2024 31.2 (L)  37.0 - 47.0 % Final    MCV 07/24/2024 92.3  80.0 - 94.0 fL Final    MCH 07/24/2024 32.0 (H)  27.0 - 31.0 pg Final    MCHC 07/24/2024 34.6  33.0 - 36.0 g/dL Final    RDW 07/24/2024 13.1  11.5 - 17.0 % Final    Platelet 07/24/2024 145  130 - 400 x10(3)/mcL Final    MPV 07/24/2024 11.1 (H)  7.4 - 10.4 fL Final    Neut % 07/24/2024 53.6  % Final    Lymph % 07/24/2024 31.2  % Final    Mono % 07/24/2024 14.1  % Final    Eos % 07/24/2024 0.2  % Final    Basophil % 07/24/2024 0.2  % Final    Lymph # 07/24/2024 1.30  0.6 - 4.6 x10(3)/mcL Final    Neut # 07/24/2024 2.23  2.1 -  9.2 x10(3)/mcL Final    Mono # 07/24/2024 0.59  0.1 - 1.3 x10(3)/mcL Final    Eos # 07/24/2024 0.01  0 - 0.9 x10(3)/mcL Final    Baso # 07/24/2024 0.01  <=0.2 x10(3)/mcL Final    IG# 07/24/2024 0.03  0 - 0.04 x10(3)/mcL Final    IG% 07/24/2024 0.7  % Final    NRBC% 07/24/2024 0.0  % Final    Sodium 07/25/2024 141  136 - 145 mmol/L Final    Potassium 07/25/2024 3.7  3.5 - 5.1 mmol/L Final    Chloride 07/25/2024 105  98 - 107 mmol/L Final    CO2 07/25/2024 28  23 - 31 mmol/L Final    Glucose 07/25/2024 113  82 - 115 mg/dL Final    Blood Urea Nitrogen 07/25/2024 9.2 (L)  9.8 - 20.1 mg/dL Final    Creatinine 07/25/2024 0.66  0.55 - 1.02 mg/dL Final    BUN/Creatinine Ratio 07/25/2024 14   Final    Calcium 07/25/2024 8.6  8.4 - 10.2 mg/dL Final    Anion Gap 07/25/2024 8.0  mEq/L Final    eGFR 07/25/2024 >60  mL/min/1.73/m2 Final    PT 07/25/2024 24.4 (H)  11.7 - 14.5 seconds Final    INR 07/25/2024 2.2  2.0 - 3.0 Final    Magnesium Level 07/25/2024 2.00  1.60 - 2.60 mg/dL Final    WBC 07/25/2024 4.13 (L)  4.50 - 11.50 x10(3)/mcL Final    RBC 07/25/2024 3.41 (L)  4.20 - 5.40 x10(6)/mcL Final    Hgb 07/25/2024 10.6 (L)  12.0 - 16.0 g/dL Final    Hct 07/25/2024 31.3 (L)  37.0 - 47.0 % Final    MCV 07/25/2024 91.8  80.0 - 94.0 fL Final    MCH 07/25/2024 31.1 (H)  27.0 - 31.0 pg Final    MCHC 07/25/2024 33.9  33.0 - 36.0 g/dL Final    RDW 07/25/2024 13.2  11.5 - 17.0 % Final    Platelet 07/25/2024 174  130 - 400 x10(3)/mcL Final    MPV 07/25/2024 10.8 (H)  7.4 - 10.4 fL Final    Neut % 07/25/2024 58.5  % Final    Lymph % 07/25/2024 27.4  % Final    Mono % 07/25/2024 11.4  % Final    Eos % 07/25/2024 1.7  % Final    Basophil % 07/25/2024 0.5  % Final    Lymph # 07/25/2024 1.13  0.6 - 4.6 x10(3)/mcL Final    Neut # 07/25/2024 2.42  2.1 - 9.2 x10(3)/mcL Final    Mono # 07/25/2024 0.47  0.1 - 1.3 x10(3)/mcL Final    Eos # 07/25/2024 0.07  0 - 0.9 x10(3)/mcL Final    Baso # 07/25/2024 0.02  <=0.2 x10(3)/mcL Final    IG# 07/25/2024  0.02  0 - 0.04 x10(3)/mcL Final    IG% 07/25/2024 0.5  % Final    NRBC% 07/25/2024 0.0  % Final    Albumin 07/25/2024 2.4 (L)  3.4 - 4.8 g/dL Final    ALP 07/25/2024 56  40 - 150 unit/L Final    ALT 07/25/2024 54  0 - 55 unit/L Final    AST 07/25/2024 61 (H)  5 - 34 unit/L Final    Bilirubin Direct 07/25/2024 0.3  0.0 - <0.5 mg/dL Final    Bilirubin Indirect 07/25/2024 0.30  0.00 - 0.80 mg/dL Final    Bilirubin Total 07/25/2024 0.6  <=1.5 mg/dL Final    Protein Total 07/25/2024 5.4 (L)  5.8 - 7.6 gm/dL Final        X-Ray Abdomen Flat And Erect    Result Date: 7/24/2024  EXAMINATION: XR ABDOMEN FLAT AND ERECT CLINICAL HISTORY: eval constipation, n/v;, . FINDINGS: The bowel gas pattern is nonspecific, and no free air or pneumatosis is seen. There is no evidence for organomegaly. No abnormal calcifications are seen.  There are some degenerative changes of the lumbosacral spine and hip joints.. Some mild to moderate amount of feces throughout the colon     No acute process is identified. Electronically signed by: Brain Roldan Date:    07/24/2024 Time:    08:35    X-Ray Chest AP Portable    Result Date: 7/23/2024  EXAMINATION: XR CHEST AP PORTABLE CLINICAL HISTORY: cough; COMPARISON: Chest x-ray dated 08/03/2023 FINDINGS: The heart is stable in size.  There is a consolidative opacity in the right lower lung.  The left lung is clear.  There is no pleural effusion or visible pneumothorax.     Findings concerning for a right-sided pneumonia. Electronically signed by: Tangela Becker Date:    07/23/2024 Time:    10:18      Microbiology Results (last 7 days)       Procedure Component Value Units Date/Time    Blood culture #1 **CANNOT BE ORDERED STAT** [[id number]]  (Normal) Collected: 07/23/24 1330    Order Status: Completed Specimen: Blood from Antecubital, Left Updated: 07/24/24 1902     Blood Culture No Growth At 24 Hours    Blood culture #2 **CANNOT BE ORDERED STAT** [[id number]]  (Normal) Collected: 07/23/24 1330     Order Status: Completed Specimen: Blood from Arm, Left Updated: 07/24/24 1902     Blood Culture No Growth At 24 Hours                Medication List        START taking these medications      albuterol 90 mcg/actuation inhaler  Commonly known as: PROVENTIL HFA  Inhale 2 puffs into the lungs every 6 (six) hours as needed for Wheezing or Shortness of Breath. Rescue     amoxicillin-clavulanate 875-125mg 875-125 mg per tablet  Commonly known as: AUGMENTIN  Take 1 tablet by mouth 2 (two) times daily.     cetirizine 10 MG tablet  Commonly known as: ZYRTEC  Take 1 tablet (10 mg total) by mouth once daily.     guaiFENesin 600 mg 12 hr tablet  Commonly known as: MUCINEX  Take 1 tablet (600 mg total) by mouth 2 (two) times daily. for 5 days     * nicotine 14 mg/24 hr  Commonly known as: NICODERM CQ  Place 1 patch onto the skin once daily.     * nicotine 14 mg/24 hr  Commonly known as: NICODERM CQ  Place 1 patch onto the skin once daily.  Start taking on: July 26, 2024     polyethylene glycol 17 gram Pwpk  Commonly known as: GLYCOLAX  Take 17 g by mouth 2 (two) times daily.     senna-docusate 8.6-50 mg 8.6-50 mg per tablet  Commonly known as: PERICOLACE  Take 2 tablets by mouth daily as needed for Constipation.           * This list has 2 medication(s) that are the same as other medications prescribed for you. Read the directions carefully, and ask your doctor or other care provider to review them with you.                CHANGE how you take these medications      * warfarin 5 MG tablet  Commonly known as: COUMADIN  Take 1 tablet (5 mg total) by mouth every Tuesday, Thursday, Saturday, Sunday.  What changed: when to take this     * warfarin 2.5 MG tablet  Commonly known as: COUMADIN  Take 1 tablet (2.5 mg total) by mouth every Mon, Wed, Fri.  Start taking on: July 26, 2024  What changed: You were already taking a medication with the same name, and this prescription was added. Make sure you understand how and when to take  each.           * This list has 2 medication(s) that are the same as other medications prescribed for you. Read the directions carefully, and ask your doctor or other care provider to review them with you.                CONTINUE taking these medications      DULoxetine 30 MG capsule  Commonly known as: CYMBALTA     LORazepam 2 MG Tab  Commonly known as: ATIVAN     pantoprazole 40 MG tablet  Commonly known as: PROTONIX            STOP taking these medications      atorvastatin 20 MG tablet  Commonly known as: LIPITOR     HYDROcodone-acetaminophen 5-325 mg per tablet  Commonly known as: NORCO               Where to Get Your Medications        These medications were sent to [facility] #[id number] - JANETH ZAMAN - [address] [facility]  AT [facility] [address]  [address] [facility] [place] [address]      Phone: [phone]   albuterol 90 mcg/actuation inhaler  amoxicillin-clavulanate 875-125mg 875-125 mg per tablet  cetirizine 10 MG tablet  guaiFENesin 600 mg 12 hr tablet  nicotine 14 mg/24 hr  polyethylene glycol 17 gram Pwpk  senna-docusate 8.6-50 mg 8.6-50 mg per tablet       Information about where to get these medications is not yet available    Ask your nurse or doctor about these medications  nicotine 14 mg/24 hr  warfarin 2.5 MG tablet  warfarin 5 MG tablet         Explained in detail to the patient about the discharge plan, medications, and follow-up visits. Pt understands and agrees with the treatment plan  Discharged Condition: stable  Medications Per DC med rec  Activities as tolerated  Follow-up dr Guadalupe 7/30  For further questions contact hospitalist office    Discharge time 30 minutes    For worsening symptoms, chest pain, shortness of breath, increased abdominal pain, high grade fever, stroke or stroke like symptoms, immediately go to the nearest Emergency Room or call 911 as soon as possible.      Eddy Berrios M.D, on 7/25/2024. at 1:42 PM.

## 2024-07-26 ENCOUNTER — PATIENT OUTREACH (OUTPATIENT)
Dept: ADMINISTRATIVE | Facility: CLINIC | Age: 69
End: 2024-07-26
Payer: MEDICARE

## 2024-07-26 NOTE — PROGRESS NOTES
C3 nurse spoke with Sandra Angel  for a [facility]CC post hospital discharge follow up call. The patient has a scheduled \[facility]\"" appointment with Loyd Guadalupe MD (Internal Medicine) on 7/30/2024 @1pm

## 2024-07-27 LAB
BACTERIA BLD CULT: NORMAL
BACTERIA BLD CULT: NORMAL

## 2024-07-28 LAB
BACTERIA BLD CULT: NORMAL
BACTERIA BLD CULT: NORMAL

## (undated) DEVICE — POSITIONER HEAD ADULT

## (undated) DEVICE — GLOVE SENSICARE PI GRN 6.5

## (undated) DEVICE — SOL NACL IRR 3000ML

## (undated) DEVICE — GOWN POLY REINF X-LONG 2XL

## (undated) DEVICE — SUT ETHILON 2-0 FS 18IN BLK

## (undated) DEVICE — PACK SURGICAL KNEE SCOPE

## (undated) DEVICE — PAD ABD 8X10 STERILE

## (undated) DEVICE — SPONGE GAUZE 16PLY 4X4

## (undated) DEVICE — TUBE SET INFLOW/OUTFLOW

## (undated) DEVICE — DRESSING XEROFORM FOIL PK 1X8

## (undated) DEVICE — GLOVE SENSICARE PI GRN 8.5

## (undated) DEVICE — PAD PREP CUFFED NS 24X48IN

## (undated) DEVICE — PADDING CAST SPECIALIST 6X4YD

## (undated) DEVICE — PADDING WYTEX UNDRCST 6INX4YD

## (undated) DEVICE — KIT SURGICAL TURNOVER

## (undated) DEVICE — BLADE SHAVER GREAT WHITE 3.5MM

## (undated) DEVICE — DRAPE STERI U-SHAPED 47X51IN

## (undated) DEVICE — CUFF ATS 2 PORT SNGL BLDR 34IN

## (undated) DEVICE — BANDAGE ACE DOUBLE STER 6IN

## (undated) DEVICE — KIT POSITIONING KNEE

## (undated) DEVICE — APPLICATOR CHLORAPREP ORN 26ML

## (undated) DEVICE — SUPPORT ULNA NERVE PROTECTOR

## (undated) DEVICE — GLOVE SIGNATURE ESSNTL LTX 6.5

## (undated) DEVICE — GLOVE SENSICARE PI MICRO 8